# Patient Record
Sex: FEMALE | Race: WHITE | HISPANIC OR LATINO | Employment: UNEMPLOYED | URBAN - METROPOLITAN AREA
[De-identification: names, ages, dates, MRNs, and addresses within clinical notes are randomized per-mention and may not be internally consistent; named-entity substitution may affect disease eponyms.]

---

## 2019-07-18 ENCOUNTER — OFFICE VISIT (OUTPATIENT)
Dept: FAMILY MEDICINE CLINIC | Facility: CLINIC | Age: 29
End: 2019-07-18

## 2019-07-18 VITALS
HEART RATE: 83 BPM | BODY MASS INDEX: 22.22 KG/M2 | WEIGHT: 125.4 LBS | OXYGEN SATURATION: 99 % | TEMPERATURE: 97.8 F | HEIGHT: 63 IN | SYSTOLIC BLOOD PRESSURE: 117 MMHG | RESPIRATION RATE: 17 BRPM | DIASTOLIC BLOOD PRESSURE: 60 MMHG

## 2019-07-18 DIAGNOSIS — R10.31 RIGHT LOWER QUADRANT PAIN: ICD-10-CM

## 2019-07-18 DIAGNOSIS — R63.4 UNEXPLAINED WEIGHT LOSS: ICD-10-CM

## 2019-07-18 DIAGNOSIS — Z83.3 FAMILY HISTORY OF TYPE 2 DIABETES MELLITUS: ICD-10-CM

## 2019-07-18 DIAGNOSIS — R11.0 NAUSEA: Primary | ICD-10-CM

## 2019-07-18 DIAGNOSIS — R42 FEELING FAINT: ICD-10-CM

## 2019-07-18 DIAGNOSIS — N92.6 MENSTRUAL ABNORMALITY: ICD-10-CM

## 2019-07-18 DIAGNOSIS — R00.2 INTERMITTENT PALPITATIONS: ICD-10-CM

## 2019-07-18 LAB
ALBUMIN SERPL-MCNC: 5 G/DL (ref 3.5–5.2)
ALBUMIN/GLOB SERPL: 1.9 G/DL
ALP SERPL-CCNC: 54 U/L (ref 39–117)
ALT SERPL W P-5'-P-CCNC: 12 U/L (ref 1–33)
ANION GAP SERPL CALCULATED.3IONS-SCNC: 12.5 MMOL/L (ref 5–15)
AST SERPL-CCNC: 12 U/L (ref 1–32)
B-HCG UR QL: NEGATIVE
BASOPHILS # BLD AUTO: 0.03 10*3/MM3 (ref 0–0.2)
BASOPHILS NFR BLD AUTO: 0.5 % (ref 0–1.5)
BILIRUB BLD-MCNC: NEGATIVE MG/DL
BILIRUB SERPL-MCNC: 0.6 MG/DL (ref 0.2–1.2)
BUN BLD-MCNC: 10 MG/DL (ref 6–20)
BUN/CREAT SERPL: 14.1 (ref 7–25)
CALCIUM SPEC-SCNC: 9.4 MG/DL (ref 8.6–10.5)
CHLORIDE SERPL-SCNC: 99 MMOL/L (ref 98–107)
CLARITY, POC: CLEAR
CO2 SERPL-SCNC: 24.5 MMOL/L (ref 22–29)
COLOR UR: YELLOW
CREAT BLD-MCNC: 0.71 MG/DL (ref 0.57–1)
DEPRECATED RDW RBC AUTO: 45.7 FL (ref 37–54)
EOSINOPHIL # BLD AUTO: 0.08 10*3/MM3 (ref 0–0.4)
EOSINOPHIL NFR BLD AUTO: 1.4 % (ref 0.3–6.2)
ERYTHROCYTE [DISTWIDTH] IN BLOOD BY AUTOMATED COUNT: 13.5 % (ref 12.3–15.4)
GFR SERPL CREATININE-BSD FRML MDRD: 98 ML/MIN/1.73
GLOBULIN UR ELPH-MCNC: 2.7 GM/DL
GLUCOSE BLD-MCNC: 90 MG/DL (ref 65–99)
GLUCOSE UR STRIP-MCNC: NEGATIVE MG/DL
HBA1C MFR BLD: 5.17 % (ref 4.8–5.6)
HCT VFR BLD AUTO: 39.1 % (ref 34–46.6)
HGB BLD-MCNC: 12.9 G/DL (ref 12–15.9)
IMM GRANULOCYTES # BLD AUTO: 0 10*3/MM3 (ref 0–0.05)
IMM GRANULOCYTES NFR BLD AUTO: 0 % (ref 0–0.5)
INTERNAL NEGATIVE CONTROL: NEGATIVE
INTERNAL POSITIVE CONTROL: POSITIVE
KETONES UR QL: ABNORMAL
LEUKOCYTE EST, POC: NEGATIVE
LYMPHOCYTES # BLD AUTO: 1.66 10*3/MM3 (ref 0.7–3.1)
LYMPHOCYTES NFR BLD AUTO: 29.4 % (ref 19.6–45.3)
Lab: NORMAL
MCH RBC QN AUTO: 30.5 PG (ref 26.6–33)
MCHC RBC AUTO-ENTMCNC: 33 G/DL (ref 31.5–35.7)
MCV RBC AUTO: 92.4 FL (ref 79–97)
MONOCYTES # BLD AUTO: 0.48 10*3/MM3 (ref 0.1–0.9)
MONOCYTES NFR BLD AUTO: 8.5 % (ref 5–12)
NEUTROPHILS # BLD AUTO: 3.39 10*3/MM3 (ref 1.7–7)
NEUTROPHILS NFR BLD AUTO: 60.2 % (ref 42.7–76)
NITRITE UR-MCNC: NEGATIVE MG/ML
NRBC BLD AUTO-RTO: 0 /100 WBC (ref 0–0.2)
PH UR: 8.5 [PH] (ref 5–8)
PLATELET # BLD AUTO: 227 10*3/MM3 (ref 140–450)
PMV BLD AUTO: 10.9 FL (ref 6–12)
POTASSIUM BLD-SCNC: 3.6 MMOL/L (ref 3.5–5.2)
PROT SERPL-MCNC: 7.7 G/DL (ref 6–8.5)
PROT UR STRIP-MCNC: ABNORMAL MG/DL
RBC # BLD AUTO: 4.23 10*6/MM3 (ref 3.77–5.28)
RBC # UR STRIP: NEGATIVE /UL
SODIUM BLD-SCNC: 136 MMOL/L (ref 136–145)
SP GR UR: 1.01 (ref 1–1.03)
T4 FREE SERPL-MCNC: 1.61 NG/DL (ref 0.93–1.7)
TSH SERPL DL<=0.05 MIU/L-ACNC: 0.89 MIU/ML (ref 0.27–4.2)
UROBILINOGEN UR QL: NORMAL
WBC NRBC COR # BLD: 5.64 10*3/MM3 (ref 3.4–10.8)

## 2019-07-18 PROCEDURE — 84439 ASSAY OF FREE THYROXINE: CPT | Performed by: NURSE PRACTITIONER

## 2019-07-18 PROCEDURE — 36415 COLL VENOUS BLD VENIPUNCTURE: CPT | Performed by: NURSE PRACTITIONER

## 2019-07-18 PROCEDURE — 93000 ELECTROCARDIOGRAM COMPLETE: CPT | Performed by: NURSE PRACTITIONER

## 2019-07-18 PROCEDURE — 84443 ASSAY THYROID STIM HORMONE: CPT | Performed by: NURSE PRACTITIONER

## 2019-07-18 PROCEDURE — 81025 URINE PREGNANCY TEST: CPT | Performed by: NURSE PRACTITIONER

## 2019-07-18 PROCEDURE — 99204 OFFICE O/P NEW MOD 45 MIN: CPT | Performed by: NURSE PRACTITIONER

## 2019-07-18 PROCEDURE — 80053 COMPREHEN METABOLIC PANEL: CPT | Performed by: NURSE PRACTITIONER

## 2019-07-18 PROCEDURE — 81003 URINALYSIS AUTO W/O SCOPE: CPT | Performed by: NURSE PRACTITIONER

## 2019-07-18 PROCEDURE — 83036 HEMOGLOBIN GLYCOSYLATED A1C: CPT | Performed by: NURSE PRACTITIONER

## 2019-07-18 PROCEDURE — 85025 COMPLETE CBC W/AUTO DIFF WBC: CPT | Performed by: NURSE PRACTITIONER

## 2019-07-18 RX ORDER — METRONIDAZOLE 500 MG/1
TABLET ORAL
Refills: 0 | COMMUNITY
Start: 2019-05-10 | End: 2019-07-18

## 2019-07-18 RX ORDER — FLUCONAZOLE 150 MG/1
TABLET ORAL
Refills: 0 | COMMUNITY
Start: 2019-06-18 | End: 2019-07-18

## 2019-07-18 RX ORDER — ONDANSETRON HYDROCHLORIDE 8 MG/1
8 TABLET, FILM COATED ORAL EVERY 8 HOURS PRN
Refills: 0 | COMMUNITY
Start: 2019-07-11 | End: 2019-07-18

## 2019-07-18 RX ORDER — ETONOGESTREL/ETHINYL ESTRADIOL .12-.015MG
RING, VAGINAL VAGINAL
Refills: 12 | COMMUNITY
Start: 2019-06-18 | End: 2019-07-18 | Stop reason: SDDI

## 2019-07-18 NOTE — PROGRESS NOTES
"Subjective   Beatriz Jackson is a 29 y.o. female.     Ms. Jackson presents today with c/o daily nausea x 2 months. Patient states that she feels sick even smelling food and she states that she has lost 20 lbs since her symptoms began. She is also c/o weakness, fatigue, feeling \"shaky\" and sometimes feeling light-headed and feeling faint. Patient states that she has never passed out. She is also concerned because she states that she has had two periods in the past month which she states is very unusual for her. She did not start her Nuvaring as she was uncomfortable with insertion process and is not using any type of birth control at this time. Patient is  and sexually active but states she has taken two home pregnancy tests with negative results.       Nausea   This is a new problem. Episode onset: 2 months ago. The problem occurs daily. The problem has been waxing and waning. Associated symptoms include abdominal pain, fatigue and nausea. Pertinent negatives include no arthralgias, change in bowel habit, chest pain, chills, congestion, coughing, diaphoresis, fever, headaches, myalgias, numbness, rash, sore throat, swollen glands, urinary symptoms, vertigo, vomiting or weakness. Nothing aggravates the symptoms. She has tried nothing for the symptoms.       The following portions of the patient's history were reviewed and updated as appropriate: allergies, current medications, past family history, past medical history, past social history, past surgical history and problem list.    Allergies as of 07/18/2019 - Reviewed 07/18/2019   Allergen Reaction Noted   • Amoxicillin Rash 07/18/2019       Current Outpatient Medications on File Prior to Visit   Medication Sig   • PROAIR  (90 Base) MCG/ACT inhaler    • [DISCONTINUED] fluconazole (DIFLUCAN) 150 MG tablet TAKE 1 TABLET BY MOUTH EVERY 72 HOURS AS A ONE TIME DOSE   • [DISCONTINUED] metroNIDAZOLE (FLAGYL) 500 MG tablet    • [DISCONTINUED] NUVARING " 0.12-0.015 MG/24HR vaginal ring INSERT 1 RING VAGINALLY EVERY MONTH. LEAVE IN PLACE FOR 3 WEEKS THEN REMOVE FOR 1 WEEK   • [DISCONTINUED] ondansetron (ZOFRAN) 8 MG tablet Take 8 mg by mouth Every 8 (Eight) Hours As Needed. for nausea     No current facility-administered medications on file prior to visit.        Review of Systems   Constitutional: Positive for appetite change, fatigue and unexpected weight change. Negative for chills, diaphoresis and fever.   HENT: Negative.  Negative for congestion and sore throat.    Respiratory: Negative.  Negative for cough.    Cardiovascular: Positive for palpitations (occasional). Negative for chest pain and leg swelling.   Gastrointestinal: Positive for abdominal pain and nausea. Negative for abdominal distention, blood in stool, change in bowel habit, constipation, diarrhea and vomiting.   Genitourinary: Positive for menstrual problem. Negative for dysuria, flank pain, hematuria and pelvic pain.   Musculoskeletal: Negative for arthralgias and myalgias.   Skin: Negative for rash.   Neurological: Positive for light-headedness. Negative for dizziness, vertigo, tremors, seizures, syncope, facial asymmetry, speech difficulty, weakness, numbness and headaches.   Psychiatric/Behavioral: The patient is nervous/anxious.        Objective   Physical Exam   Constitutional: She is oriented to person, place, and time. Vital signs are normal. She appears well-developed and well-nourished. She is cooperative.  Non-toxic appearance. She does not have a sickly appearance. She does not appear ill. No distress.   HENT:   Head: Normocephalic and atraumatic.   Right Ear: External ear normal.   Left Ear: External ear normal.   Nose: Nose normal.   Mouth/Throat: Oropharynx is clear and moist.   Eyes: Pupils are equal, round, and reactive to light. No scleral icterus.   Neck: Normal range of motion. Neck supple. Thyromegaly: mildly enlarged.   Cardiovascular: Normal rate, regular rhythm and normal  heart sounds.   No murmur heard.  Pulmonary/Chest: Effort normal and breath sounds normal.   Abdominal: Soft. Normal appearance and bowel sounds are normal. She exhibits no mass. There is no hepatosplenomegaly. There is tenderness in the right lower quadrant. There is no rigidity and no CVA tenderness.     Vascular Status -  Her right foot exhibits no edema. Her left foot exhibits no edema.  Lymphadenopathy:     She has no cervical adenopathy.   Neurological: She is alert and oriented to person, place, and time.   Skin: Skin is warm and dry. No rash noted. She is not diaphoretic.   Vitals reviewed.    Vitals:    07/18/19 0917   BP: 117/60   Pulse: 83   Resp: 17   Temp: 97.8 °F (36.6 °C)   SpO2: 99%     Body mass index is 22.21 kg/m².    Assessment/Plan   Beatriz was seen today for weight loss.    Diagnoses and all orders for this visit:    Nausea  -     POC Pregnancy, Urine  -     US Abdomen Complete; Future  -     US Pelvis Complete; Future    Unexplained weight loss  -     TSH  -     T4, Free  -     US Abdomen Complete; Future  -     US Pelvis Complete; Future    Menstrual abnormality  -     TSH  -     T4, Free  -     POC Urinalysis Dipstick, Automated  -     POC Pregnancy, Urine  -     US Pelvis Complete; Future    Intermittent palpitations  -     ECG 12 Lead  -     Comprehensive Metabolic Panel  -     TSH  -     T4, Free    Feeling faint  -     ECG 12 Lead  -     CBC Auto Differential    Right lower quadrant pain  -     US Abdomen Complete; Future  -     US Pelvis Complete; Future    Family history of type 2 diabetes mellitus  -     Hemoglobin A1c      ECG 12 Lead  Date/Time: 7/18/2019 9:50 AM  Performed by: Tara Castro APRN  Authorized by: Tara Castro APRN   Comparison: not compared with previous ECG   Previous ECG: no previous ECG available  Rhythm: sinus rhythm  Rate: normal  BPM: 74  Conduction: conduction normal  QRS axis: normal  Other findings: poor R wave progression    Clinical impression:  normal ECG          Brief Urine Lab Results  (Last result in the past 365 days)      Color   Clarity   Blood   Leuk Est   Nitrite   Protein   CREAT   Urine HCG        07/18/19 1338               Negative         Discussed the nature of the medical condition(s) risks, complications, implications, management, safe and proper use of medications. Encouraged medication compliance, and keeping scheduled follow up appointments with me and any other providers.     Laboratory testing ordered today. Further recommendations after lab evaluation.

## 2019-07-18 NOTE — PATIENT INSTRUCTIONS
Dizziness  Dizziness is a common problem. It is a feeling of unsteadiness or light-headedness. You may feel like you are about to faint. Dizziness can lead to injury if you stumble or fall. Anyone can become dizzy, but dizziness is more common in older adults. This condition can be caused by a number of things, including medicines, dehydration, or illness.  Follow these instructions at home:  Eating and drinking  · Drink enough fluid to keep your urine clear or pale yellow. This helps to keep you from becoming dehydrated. Try to drink more clear fluids, such as water.  · Do not drink alcohol.  · Limit your caffeine intake if told to do so by your health care provider. Check ingredients and nutrition facts to see if a food or beverage contains caffeine.  · Limit your salt (sodium) intake if told to do so by your health care provider. Check ingredients and nutrition facts to see if a food or beverage contains sodium.  Activity  · Avoid making quick movements.  ? Rise slowly from chairs and steady yourself until you feel okay.  ? In the morning, first sit up on the side of the bed. When you feel okay, stand slowly while you hold onto something until you know that your balance is fine.  · If you need to  one place for a long time, move your legs often. Tighten and relax the muscles in your legs while you are standing.  · Do not drive or use heavy machinery if you feel dizzy.  · Avoid bending down if you feel dizzy. Place items in your home so that they are easy for you to reach without leaning over.  Lifestyle  · Do not use any products that contain nicotine or tobacco, such as cigarettes and e-cigarettes. If you need help quitting, ask your health care provider.  · Try to reduce your stress level by using methods such as yoga or meditation. Talk with your health care provider if you need help to manage your stress.  General instructions  · Watch your dizziness for any changes.  · Take over-the-counter and  prescription medicines only as told by your health care provider. Talk with your health care provider if you think that your dizziness is caused by a medicine that you are taking.  · Tell a friend or a family member that you are feeling dizzy. If he or she notices any changes in your behavior, have this person call your health care provider.  · Keep all follow-up visits as told by your health care provider. This is important.  Contact a health care provider if:  · Your dizziness does not go away.  · Your dizziness or light-headedness gets worse.  · You feel nauseous.  · You have reduced hearing.  · You have new symptoms.  · You are unsteady on your feet or you feel like the room is spinning.  Get help right away if:  · You vomit or have diarrhea and are unable to eat or drink anything.  · You have problems talking, walking, swallowing, or using your arms, hands, or legs.  · You feel generally weak.  · You are not thinking clearly or you have trouble forming sentences. It may take a friend or family member to notice this.  · You have chest pain, abdominal pain, shortness of breath, or sweating.  · Your vision changes.  · You have any bleeding.  · You have a severe headache.  · You have neck pain or a stiff neck.  · You have a fever.  These symptoms may represent a serious problem that is an emergency. Do not wait to see if the symptoms will go away. Get medical help right away. Call your local emergency services (911 in the [place] Do not drive yourself to the hospital.  Summary  · Dizziness is a feeling of unsteadiness or light-headedness. This condition can be caused by a number of things, including medicines, dehydration, or illness.  · Anyone can become dizzy, but dizziness is more common in older adults.  · Drink enough fluid to keep your urine clear or pale yellow. Do not drink alcohol.  · Avoid making quick movements if you feel dizzy. Monitor your dizziness for any changes.  This information is not intended to  replace advice given to you by your health care provider. Make sure you discuss any questions you have with your health care provider.  Document Released: 06/13/2002 Document Revised: 01/20/2018 Document Reviewed: 01/20/2018  [email or web address] Interactive Patient Education © 2019 [email or web address]    Nausea, Adult  Nausea is the feeling of an upset stomach or having to vomit. Nausea on its own is not usually a serious concern, but it may be an early sign of a more serious medical problem. As nausea gets worse, it can lead to vomiting. If vomiting develops, or if you are not able to drink enough fluids, you are at risk of becoming dehydrated. Dehydration can make you tired and thirsty, cause you to have a dry mouth, and decrease how often you urinate. Older adults and people with other diseases or a weak immune system are at higher risk for dehydration. The main goals of treating your nausea are:  · To limit repeated nausea episodes.  · To prevent vomiting and dehydration.    Follow these instructions at home:  Follow instructions from your health care provider about how to care for yourself at home.  Eating and drinking  Follow these recommendations as told by your health care provider:  · Take an oral rehydration solution (ORS). This is a drink that is sold at pharmacies and retail stores.  · Drink clear fluids in small amounts as you are able. Clear fluids include water, ice chips, diluted fruit juice, and low-calorie sports drinks.  · Eat bland, easy-to-digest foods in small amounts as you are able. These foods include bananas, applesauce, rice, lean meats, toast, and crackers.  · Avoid drinking fluids that contain a lot of sugar or caffeine, such as energy drinks, sports drinks, and soda.  · Avoid alcohol.  · Avoid spicy or fatty foods.    General instructions  · Drink enough fluid to keep your urine clear or pale yellow.  · Wash your hands often. If soap and water are not available, use hand .  · Make sure that all  people in your household wash their hands well and often.  · Rest at home while you recover.  · Take over-the-counter and prescription medicines only as told by your health care provider.  · Breathe slowly and deeply when you feel nauseous.  · Watch your condition for any changes.  · Keep all follow-up visits as told by your health care provider. This is important.  Contact a health care provider if:  · You have a headache.  · You have new symptoms.  · Your nausea gets worse.  · You have a fever.  · You feel light-headed or dizzy.  · You vomit.  · You cannot keep fluids down.  Get help right away if:  · You have pain in your chest, neck, arm, or jaw.  · You feel extremely weak or you faint.  · You have vomit that is bright red or looks like coffee grounds.  · You have bloody or black stools or stools that look like tar.  · You have a severe headache, a stiff neck, or both.  · You have severe pain, cramping, or bloating in your abdomen.  · You have a rash.  · You have difficulty breathing or are breathing very quickly.  · Your heart is beating very quickly.  · Your skin feels cold and clammy.  · You feel confused.  · You have pain when you urinate.  · You have signs of dehydration, such as:  ? Dark urine, very little, or no urine.  ? Cracked lips.  ? Dry mouth.  ? Sunken eyes.  ? Sleepiness.  ? Weakness.  These symptoms may represent a serious problem that is an emergency. Do not wait to see if the symptoms will go away. Get medical help right away. Call your local emergency services (911 in the [place] Do not drive yourself to the hospital.  This information is not intended to replace advice given to you by your health care provider. Make sure you discuss any questions you have with your health care provider.  Document Released: 01/25/2006 Document Revised: 05/22/2017 Document Reviewed: 08/23/2016  [employer] Interactive Patient Education © 2019 [employer] Inc.    Near-Syncope  Near-syncope is when you suddenly become  weak or dizzy, or you feel like you might pass out (faint). During an episode of near-syncope, you may:  · Feel dizzy or light-headed.  · Feel nauseous.  · See all white or all black in your field of vision.  · Have cold, clammy skin.    This condition is caused by a sudden decrease in blood flow to the brain. This decrease can result from various causes, but most of those causes are not dangerous. However, near-syncope can be a sign of a serious medical problem, so it is important to seek medical care.  If you fainted, get medical help right away.Call your local emergency services (911 in the [place] Do not drive yourself to the hospital.  Follow these instructions at home:  Pay attention to any changes in your symptoms. Take these actions to help with your condition:  · Have someone stay with you until you feel stable.  · Do not drive, use machinery, or play sports until your health care provider says it is okay.  · Keep all follow-up visits as told by your health care provider. This is important.  · If you start to feel like you might faint, lie down right away and raise (elevate) your feet above the level of your heart. Breathe deeply and steadily. Wait until all of the symptoms have passed.  · Drink enough fluid to keep your urine clear or pale yellow.  · If you are taking blood pressure or heart medicine, get up slowly and take several minutes to sit and then stand. This can reduce dizziness.  · Take over-the-counter and prescription medicines only as told by your health care provider.    Get help right away if:  · You have a severe headache.  · You have unusual pain in your chest, abdomen, or back.  · You are bleeding from your mouth or rectum, or you have black or tarry stool.  · You have a very fast or irregular heartbeat (palpitations).  · You faint once or repeatedly.  · You have a seizure.  · You are confused.  · You have trouble walking.  · You have severe weakness.  · You have vision problems.  These  symptoms may represent a serious problem that is an emergency. Do not wait to see if your symptoms will go away. Get medical help right away. Call your local emergency services (911 in the [place] Do not drive yourself to the hospital.  This information is not intended to replace advice given to you by your health care provider. Make sure you discuss any questions you have with your health care provider.  Document Released: 12/18/2006 Document Revised: 01/30/2018 Document Reviewed: 08/31/2016  [employer] Interactive Patient Education © 2019 [employer].    Náuseas, en adultos  Nausea, Adult  Náuseas es la sensación de malestar en el estómago o de tener ganas de vomitar. Las náuseas en sí a menudo no constituyen garret preocupación seria, elen pueden ser un signo temprano de problemas médicos más graves. Si las náuseas empeoran, pueden provocar vómitos. Si los vómitos empeoran o si usted no puede beber suficiente líquido, corre el riesgo de deshidratarse. La deshidratación puede causarle cansancio, sed, sequedad en la boca y disminución en la frecuencia con la que orina. Los adultos mayores y las personas que tienen otras enfermedades o un sistema inmunitario débil están en mayor riesgo de deshidratación. Los objetivos principales del tratamiento de las náuseas son los siguientes:  · Restringir los episodios reiterados de náuseas.  · Prevenir los vómitos y la deshidratación.    Siga estas indicaciones en brasher casa:  Siga las indicaciones del médico sobre cómo debe cuidarse en brasher casa.  Qué debe comer y beber  Siga estas recomendaciones thiago se lo haya indicado el médico:  · [place] garret solución de rehidratación oral (oral rehydration solution, ORS). Esta es garret bebida que se vende en farmacias y tiendas minoristas.  · Saima líquidos veronique en pequeñas cantidades según le sea posible. Saima líquidos veronique, thiago agua, cubitos de hielo, jugos de fruta diluidos y bebidas deportivas bajas en calorías.  · En la medida en que pueda,  consuma alimentos blandos y fáciles de digerir en pequeñas cantidades. Estos alimentos incluyen bananas, compota de manzana, arroz, brian magras, tostadas y galletas saladas.  · Evite consumir líquidos que contengan mucha azúcar o cafeína, thiago bebidas energéticas, bebidas deportivas y refrescos.  · Evite el alcohol.  · Evite los alimentos picantes o con alto contenido de grasa.    Instrucciones generales  · Saima suficiente líquido para mantener la orina rusty o de color amarillo pálido.  · Lávese las tima con frecuencia. Use desinfectante para tima si no dispone de agua y jabón.  · Asegúrese de que todas las personas que viven en brasher casa se laven jayshree las tima y con frecuencia.  · Descanse en brasher casa mientras se recupera.  · [place] los medicamentos de venta pawan y los recetados solamente thiago se lo haya indicado el médico.  · Cuando sienta náuseas, respire lenta y profundamente.  · Controle brasher afección para detectar cualquier cambio.  · Concurra a todas las visitas de seguimiento thiago se lo haya indicado el médico. [place] es importante.  Comuníquese con un médico si:  · Tiene dolor de bebe.  · Aparecen nuevos síntomas.  · Las náuseas empeoran.  · Tiene fiebre.  · Se siente mareado o siente que va a desvanecerse.  · Vomita.  · No puede retener los líquidos.  Solicite ayuda de inmediato si:  · Siente dolor en el pecho, el antonia, los brazos o la mandíbula.  · Se siente muy débil o se desmaya.  · Vomita y el vómito es de color jo intenso o se asemeja al poso del café.  · Tiene heces con ariane, de color page, o con aspecto alquitranado.  · Siente dolor de bebe intenso, rigidez en el antonia, o ambas cosas.  · Tiene dolor intenso, cólicos o distensión abdominal.  · Tiene garret erupción cutánea.  · Tiene dificultades para respirar o respira muy rápido.  · Brasher corazón late muy rápidamente.  · Siente la piel fría y húmeda.  · Se siente confundido.  · Siente dolor al orinar.  · Tiene signos de deshidratación, thiago los  siguientes:  ? Orina de color oscuro, muy escasa o no orina.  ? Labios agrietados.  ? Boca seca.  ? Ojos hundidos.  ? Somnolencia.  ? Debilidad.  Estos síntomas pueden representar un problema grave que constituye garret emergencia. No espere hasta que los síntomas desaparezcan. Solicite atención médica de inmediato. Comuníquese con el servicio de emergencias de brasher localidad (911 en los [place]). No conduzca por fredrick propios medios hasta el hospital.  Esta información no tiene thiago fin reemplazar el consejo del médico. Asegúrese de hacerle al médico cualquier pregunta que tenga.  Document Released: 12/18/2006 Document Revised: 04/30/2018 Document Reviewed: 08/23/2016  [employer] Interactive Patient Education © 2019 [employer].    Palpitaciones  Palpitations  Es la sensación de sentir que el latido cardíaco es irregular o es más rápido que lo normal. Se siente thiago un aleteo o que falta un latido. Generalmente no es un problema grave. Las causas de las palpitaciones pueden ser diversas, entre ellas, el estrés y consumo de cigarrillos, cafeína, alcohol y determinados medicamentos. Si jayshree la mayoría de las causas de las palpitaciones no son graves, estas pueden ser un signo de un problema médico grave. En algunos casos, podría ser necesario hacer más estudios.  Siga estas instrucciones en brasher casa:  Esté atento a cualquier cambio en los síntomas. [place] estas medidas para controlar la afección:  · Evite consumir lo siguiente:  ? Bebidas que contengan cafeína thiago el café, el té, los refrescos, las pastillas para adelgazar y las bebidas energizantes.  ? Chocolate.  ? Alcohol.  · No consuma ningún producto que contenga tabaco, lo que incluye cigarrillos, tabaco de mascar y cigarrillos electrónicos. Si necesita ayuda para dejar de fumar, consulte al médico.  · Trate de reducir los niveles de estrés y ansiedad. Algunas cosas que pueden ayudarlo a relajarse son:  ? Practicar yoga.  ? Meditación.  ? Actividad física thiago  natación, trote o caminatas.  ? Biorretroalimentación. [place] es un método que le enseña a usar la mente para controlar cosas del cuerpo, thiago los latidos del corazón.  · Descanse y duerma lo suficiente.  · [place] los medicamentos de venta pawan y los recetados solamente thiago se lo haya indicado el médico.  · Concurra a todas las visitas de control thiago se lo haya indicado el médico. [place] es importante.    Comuníquese con un médico si:  · Continúa con latidos cardíacos rápidos o irregulares después de 24 horas.  · Las palpitaciones le suceden con más frecuencia.  Solicite ayuda de inmediato si:  · Siente falta de aire o dolor en el pecho.  · Tiene un dolor de bebe intenso.  · Se siente mareado o se desmaya.  Esta información no tiene thiago fin reemplazar el consejo del médico. Asegúrese de hacerle al médico cualquier pregunta que tenga.  Document Released: 09/27/2006 Document Revised: 03/27/2018 Document Reviewed: 09/01/2016  [employer] Interactive Patient Education © 2019 [employer]    Presíncope  Near-Syncope  El presíncope ocurre cuando, súbitamente, usted se siente débil, mareado o thiago si se fuera a desmayar. [place] un episodio de presíncope, usted puede tener los siguientes síntomas:  · Sentirse mareado o aturdido.  · Sentir náuseas.  · [place] todo villela o page en el [place] de la visión.  · Tener la piel fría y húmeda.    La causa de esta afección es garret disminución súbita del flujo de ariane al cerebro. Esta disminución puede ocurrir por varios motivos, elen la mayoría de las veces no es peligroso. Sin embargo, el presíncope puede ser garret señal de un problema médico grave, por eso es importante buscar atención médica.  Si se desmayó, obtenga ayuda de inmediato.Llame a brasher servicio de emergencias local (911 en los [place] No conduzca por fredrick propios medios hasta el hospital.  Siga estas indicaciones en brasher casa:  Esté atento a cualquier cambio en los síntomas. [place] estas medidas para controlar brasher  afección:  · [place] a alguien que se quede con usted hasta que se sienta estable.  · No conduzca vehículos, no use maquinarias ni practique deportes hasta que el médico lo autorice.  · Concurra a todas las visitas de seguimiento thiago se lo haya indicado el médico. [place] es importante.  · Si comienza a sentir que podría desmayarse, recuéstese de inmediato y alce (eleve) los pies por encima del nivel del corazón. Respire profundamente y de manera continua. Espere hasta que los síntomas hayan desaparecido.  · Saima suficiente líquido thiago para mantener la orina rusty o de color amarillo pálido.  · Si está tomando medicamentos para la presión arterial o para el corazón, póngase de pie lentamente, tómese algunos minutos para permanecer sentado y luego párese. [place] reduce los mareos.  · [place] los medicamentos de venta pawan y los recetados solamente thiago se lo haya indicado el médico.    Solicite ayuda de inmediato si:  · Tiene un dolor de bebe intenso.  · Siente un dolor fuera de lo normal en el pecho, el abdomen o la espalda.  · Sangra por la boca o el recto, o fredrick heces son de color page o aspecto alquitranado.  · Tiene latidos cardíacos irregulares o muy rápidos (palpitaciones).  · Se desmaya garret o varias veces.  · Tiene garret convulsión.  · Se siente confundido.  · Presenta dificultad para caminar.  · Siente debilidad intensa.  · Tiene problemas de visión.  Estos síntomas pueden representar un problema grave que constituye garret emergencia. No espere hasta que los síntomas desaparezcan. Solicite atención médica de inmediato. Comuníquese con el servicio de emergencias de brasher localidad (911 en los [place]). No conduzca por fredrick propios medios hasta el hospital.  Esta información no tiene thiago fin reemplazar el consejo del médico. Asegúrese de hacerle al médico cualquier pregunta que tenga.  Document Released: 12/18/2006 Document Revised: 09/17/2018 Document Reviewed: 08/31/2016  [employer] Interactive Patient Education  © 2019 [employer] Inc.    Dolor abdominal en adultos  Abdominal Pain, Adult  El dolor abdominal puede tener muchas causas. A menudo, no es grave y mejora sin tratamiento o con tratamiento en la casa. Sin embargo, a veces el dolor abdominal es intenso. El médico revisará fredrick antecedentes médicos y le hará un examen físico para tratar de determinar la causa del dolor abdominal.  Siga estas instrucciones en brasher casa:  · [place] los medicamentos de venta pawan y los recetados solamente thiago se lo haya indicado el médico. No tome un laxante a menos que se lo haya indicado el médico.  · Saima suficiente líquido para mantener la orina rusty o de color amarillo pálido.  · Controle brasher afección para lani si hay cambios.  · Concurra a todas las visitas de control thiago se lo haya indicado el médico. [place] es importante.  Comuníquese con un médico si:  · El dolor abdominal cambia o empeora.  · No tiene apetito o baja de peso sin proponérselo.  · Está estreñido o tiene diarrea sofia más de 2 o 3 días.  · Tiene dolor cuando orina o defeca.  · El dolor abdominal lo despierta de noche.  · El dolor empeora con las comidas, después de comer o con determinados alimentos.  · Tiene vómitos y no puede retener nada.  · Tiene fiebre.  Solicite ayuda de inmediato si:  · El dolor no desaparece tan pronto thiago el médico le dijo que era esperable.  · No puede detener los vómitos.  · El dolor se siente solo en zonas del abdomen, thiago el lado derecho o la parte inferior izquierda del abdomen.  · Las heces son sanguinolentas o de color page, o de aspecto alquitranado.  · Tiene dolor intenso, cólicos, o meteorismo en el abdomen.  · Tiene signos de deshidratación, por ejemplo:  ? Orina oscura, muy escasa o falta de orina.  ? Labios agrietados.  ? Boca seca.  ? Ojos hundidos.  ? Somnolencia.  ? Debilidad.  Esta información no tiene thiago fin reemplazar el consejo del médico. Asegúrese de hacerle al médico cualquier pregunta que tenga.  Document Released:  12/18/2006 Document Revised: 12/07/2017 Document Reviewed: 05/31/2017  [employer] Interactive Patient Education © 2019 [employer]  Ecografía abdominal o pélvica  (Abdominal or Pelvic Ultrasound)  Garret ecografía es garret prueba que utiliza ondas sonoras para soy imágenes del interior del cuerpo. Garret ecografía abdominal jessica imágenes del interior del abdomen y garret ecografía pélvica jessica imágenes del interior de la pelvis. Garret ecografía abdominal o pélvica se puede realizar para lo siguiente:  · Proporcionar información sobre un bebé que se está desarrollando en el útero, thiago la posición y el ritmo cardíaco del bebé.  · Para controlar la forma o el tamaño de un órgano.  · Para controlar si hay problemas thiago los siguientes:  ? Quistes.  ? Masas.  ? Inflamación.  ? Cálculos en el riñón.  ? Cálculos biliares.  INFORME A BRASHER MÉDICO:  · Cualquier alergia que tenga.  · Todos los medicamentos que utiliza, incluidos vitaminas, hierbas, gotas oftálmicas, cremas y medicamentos de venta pawan.  · Si tiene cirugías previas.  · Cualquier enfermedad que tenga.  · Si está embarazada o podría estarlo.  RIESGOS Y COMPLICACIONES  No se conocen riesgos o complicaciones por someterse esta prueba.  ANTES DEL PROCEDIMIENTO  · Siga las indicaciones del médico respecto de las restricciones para las comidas o las bebidas.  · Use ropa que se pueda elmo fácilmente en shasha de que el gel usado para la prueba manche la ropa.  PROCEDIMIENTO  · Le aplicarán un gel sobre la piel. Puede sentir que está frío.  · En la kojo a examinar, se colocará un dispositivo similar a garret vara denominado transductor.  · El transductor tomará imágenes. Estas imágenes se verán en cayla o más monitores similares a pequeñas pantallas de televisión.  DESPUÉS DEL PROCEDIMIENTO  · Es brahser responsabilidad retirar el resultado del estudio. Consulte a brasher médico o en el departamento donde se realice el procedimiento cuándo estarán listos los resultados.  · Concurra a todas las  visitas de control thiago se lo haya indicado el médico. [place] es importante.  Esta información no tiene thiago fin reemplazar el consejo del médico. Asegúrese de hacerle al médico cualquier pregunta que tenga.  Document Released: 09/27/2006 Document Revised: 04/10/2017 Document Reviewed: 09/09/2016  Elsevier Interactive Patient Education © 2019 [employer]

## 2019-07-19 ENCOUNTER — LAB (OUTPATIENT)
Dept: FAMILY MEDICINE CLINIC | Facility: CLINIC | Age: 29
End: 2019-07-19

## 2019-07-19 DIAGNOSIS — R11.0 NAUSEA: ICD-10-CM

## 2019-07-19 DIAGNOSIS — R63.4 UNEXPLAINED WEIGHT LOSS: ICD-10-CM

## 2019-07-19 DIAGNOSIS — R10.31 RIGHT LOWER QUADRANT PAIN: ICD-10-CM

## 2019-07-19 PROCEDURE — 36415 COLL VENOUS BLD VENIPUNCTURE: CPT | Performed by: NURSE PRACTITIONER

## 2019-07-19 PROCEDURE — 80074 ACUTE HEPATITIS PANEL: CPT | Performed by: NURSE PRACTITIONER

## 2019-07-19 PROCEDURE — 87340 HEPATITIS B SURFACE AG IA: CPT | Performed by: NURSE PRACTITIONER

## 2019-07-20 LAB
HAV IGM SERPL QL IA: NORMAL
HBV CORE IGM SERPL QL IA: NORMAL
HBV SURFACE AG SERPL QL IA: NORMAL
HCV AB SER DONR QL: NORMAL

## 2019-07-21 ENCOUNTER — TELEPHONE (OUTPATIENT)
Dept: FAMILY MEDICINE CLINIC | Facility: CLINIC | Age: 29
End: 2019-07-21

## 2019-07-21 NOTE — TELEPHONE ENCOUNTER
Pt advised.   ----- Message from SHEY Billy sent at 7/19/2019  6:54 PM EDT -----  Please let her know that her lab results were normal.

## 2019-07-25 ENCOUNTER — TELEPHONE (OUTPATIENT)
Dept: FAMILY MEDICINE CLINIC | Facility: CLINIC | Age: 29
End: 2019-07-25

## 2019-07-25 NOTE — TELEPHONE ENCOUNTER
Pt advised.   ----- Message from SHEY Billy sent at 7/24/2019  9:14 AM EDT -----  Please let her know that she tested negative for Hepatitis A, B and C.

## 2019-08-01 ENCOUNTER — HOSPITAL ENCOUNTER (OUTPATIENT)
Dept: ULTRASOUND IMAGING | Facility: HOSPITAL | Age: 29
Discharge: HOME | End: 2019-08-01

## 2019-08-01 ENCOUNTER — HOSPITAL ENCOUNTER (OUTPATIENT)
Dept: ULTRASOUND IMAGING | Facility: HOSPITAL | Age: 29
Discharge: HOME | End: 2019-08-01
Admitting: NURSE PRACTITIONER

## 2019-08-01 DIAGNOSIS — R11.0 NAUSEA: ICD-10-CM

## 2019-08-01 DIAGNOSIS — R10.31 RIGHT LOWER QUADRANT PAIN: ICD-10-CM

## 2019-08-01 DIAGNOSIS — N92.6 MENSTRUAL ABNORMALITY: ICD-10-CM

## 2019-08-01 DIAGNOSIS — R63.4 UNEXPLAINED WEIGHT LOSS: ICD-10-CM

## 2019-08-01 PROCEDURE — 76700 US EXAM ABDOM COMPLETE: CPT

## 2019-08-01 PROCEDURE — 76856 US EXAM PELVIC COMPLETE: CPT

## 2019-08-05 ENCOUNTER — TELEPHONE (OUTPATIENT)
Dept: FAMILY MEDICINE CLINIC | Facility: CLINIC | Age: 29
End: 2019-08-05

## 2019-08-05 NOTE — TELEPHONE ENCOUNTER
Called patient to let her know results of US. Patient states she understands and has no questions at this time.   ----- Message from SHEY Billy sent at 8/5/2019  9:45 AM EDT -----  Please let her know that her ultrasound of the abdomen and pelvis were normal. If she is still experiencing symptoms (nausea, abdominal pain, weight loss, etc.) I would recommend she follow-up with Dr. Madison this week (her PCP) for further evaluation.

## 2022-11-21 ENCOUNTER — TELEPHONE (OUTPATIENT)
Dept: OBSTETRICS AND GYNECOLOGY | Facility: CLINIC | Age: 32
End: 2022-11-21

## 2022-11-21 NOTE — TELEPHONE ENCOUNTER
Provider: DR EVANS  Caller: CORRINA OLIVO  Relationship to Patient: SELF  Reason for Call: SAME DAY CANCEL - STARTED CYCLE.    PLEASE CALL PT TO RESCHEDULE - UNABLE TO FIND ANY AVAILABILITY FOR DR EVANS.  ALSO TRIED WITH [facility] - NO AVAILABLE NEW GYN APPTS.

## 2023-02-09 ENCOUNTER — PATIENT ROUNDING (BHMG ONLY) (OUTPATIENT)
Dept: OBSTETRICS AND GYNECOLOGY | Facility: CLINIC | Age: 33
End: 2023-02-09
Payer: COMMERCIAL

## 2023-02-09 ENCOUNTER — OFFICE VISIT (OUTPATIENT)
Dept: OBSTETRICS AND GYNECOLOGY | Facility: CLINIC | Age: 33
End: 2023-02-09
Payer: COMMERCIAL

## 2023-02-09 VITALS — WEIGHT: 149.6 LBS | DIASTOLIC BLOOD PRESSURE: 78 MMHG | BODY MASS INDEX: 26.5 KG/M2 | SYSTOLIC BLOOD PRESSURE: 130 MMHG

## 2023-02-09 DIAGNOSIS — Z01.419 WOMEN'S ANNUAL ROUTINE GYNECOLOGICAL EXAMINATION: Primary | ICD-10-CM

## 2023-02-09 DIAGNOSIS — Z30.015 ENCOUNTER FOR INITIAL PRESCRIPTION OF VAGINAL RING HORMONAL CONTRACEPTIVE: ICD-10-CM

## 2023-02-09 LAB
B-HCG UR QL: NEGATIVE
EXPIRATION DATE: NORMAL
INTERNAL NEGATIVE CONTROL: NORMAL
INTERNAL POSITIVE CONTROL: NORMAL
Lab: NORMAL

## 2023-02-09 PROCEDURE — 99385 PREV VISIT NEW AGE 18-39: CPT

## 2023-02-09 PROCEDURE — 81025 URINE PREGNANCY TEST: CPT

## 2023-02-09 RX ORDER — ETONOGESTREL AND ETHINYL ESTRADIOL 11.7; 2.7 MG/1; MG/1
INSERT, EXTENDED RELEASE VAGINAL
Qty: 3 EACH | Refills: 3 | Status: SHIPPED | OUTPATIENT
Start: 2023-02-09

## 2023-02-09 NOTE — PROGRESS NOTES
February 9, 2023    Hello, may I speak with Beatriz Byers?    My name is KEISHA    I am  with [facility]   [facility] [address]  [address]  [facility] [address] [phone].    Before we get started may I verify your date of birth? 1990    I am calling to officially welcome you to our practice and ask about your recent visit. Is this a good time to talk? yes    Tell me about your visit with us. What things went well?  EVERYONE WAS VERY SWEET AND MADE HER FEEL COMFORTABLE FROM CHECK IN TO CHECK OUT GREAT       We're always looking for ways to make our patients' experiences even better. Do you have recommendations on ways we may improve?  no    Overall were you satisfied with your first visit to our practice? yes       I appreciate you taking the time to speak with me today. Is there anything else I can do for you? no      Thank you, and have a great day.

## 2023-02-09 NOTE — PROGRESS NOTES
Gynecologic Annual Exam Note        Gynecologic Exam and Establish Care        Subjective     HPI  Beatriz Byers is a 32 y.o.  female who presents for annual well woman exam as a new patient. There were no changes to her medical or surgical history since her last visit.. Patient reports problems with: frequent/recurring BV. Patient's last menstrual period was 2023 (exact date).. Her periods occur every 25-35 days , lasting 4 days. The flow is heavy saturating a pad/tampon every 4 hours. This heavy bleeding lasts for 4 days of her period... She reports dysmenorrhea is moderate, occurring throughout menses. Partner Status: Marital Status: .  She is sexually active. She has not had new partners.. STD testing recommendations have been explained to the patient and she does desire STD testing.    Patient reports she does heavy lifting, she reports for about a month she has been feeling vaginal pressure, she reports she has seen a protrusion but not all the way out of her body. She reports occasional lower back pain, she reports 2 weeks she has had constipation.     Additional OB/GYN History   Current contraception: contraceptive methods: None  Desires to: start contraception  Thromboembolic Disease: none  Age of menarche: 12    History of STD: no    Last Pap : 3-4 years ago per patient. Results: unknown . HPV: unknown . Patient reports never had abnormal   Last Completed Pap Smear     This patient has no relevant Health Maintenance data.           History of abnormal Pap smear: no  Gardasil status:missed  Family history of uterine, colon, breast, or ovarian cancer: no  Performs monthly Self-Breast Exam: no  Exercises Regularly:yes  Feelings of Anxiety or Depression: no  Tobacco Usage?: No       Current Outpatient Medications:   •  PROAIR  (90 Base) MCG/ACT inhaler, , Disp: , Rfl: 1  •  etonogestrel-ethinyl estradiol (NuvaRing) 0.12-0.015 MG/24HR vaginal ring, Insert vaginally and  leave in place for 3 consecutive weeks, then remove for 1 week., Disp: 3 each, Rfl: 3     Patient denies the need for medication refills today.    OB History        4    Para   3    Term   3            AB   1    Living           SAB   1    IAB        Ectopic        Molar        Multiple        Live Births                    Health Maintenance   Topic Date Due   • Annual Gynecologic Pelvic and Breast Exam  Never done   • COVID-19 Vaccine (1) Never done   • TDAP/TD VACCINES (1 - Tdap) Never done   • ANNUAL PHYSICAL  Never done   • PAP SMEAR  Never done   • INFLUENZA VACCINE  2022   • HEPATITIS C SCREENING  Completed   • Pneumococcal Vaccine 0-64  Aged Out       Past Medical History:   Diagnosis Date   • Allergic         Past Surgical History:   Procedure Laterality Date   •  SECTION      Two        The additional following portions of the patient's history were reviewed and updated as appropriate: allergies, current medications, past family history, past medical history, past social history and past surgical history.    Review of Systems   Genitourinary: Positive for menstrual problem.   Psychiatric/Behavioral: Negative for depressed mood. The patient is not nervous/anxious.    All other systems reviewed and are negative.        I have reviewed and agree with the HPI, ROS, and historical information as entered above. Savanna Tate, APRN        Objective   /78   Wt 67.9 kg (149 lb 9.6 oz)   LMP 2023 (Exact Date)   BMI 26.50 kg/m²     Physical Exam  Vitals and nursing note reviewed. Exam conducted with a chaperone present.   Constitutional:       General: She is not in acute distress.     Appearance: She is not ill-appearing.   Pulmonary:      Effort: Pulmonary effort is normal. No respiratory distress.   Chest:   Breasts:     Right: Normal.      Left: Normal.   Abdominal:      General: There is no distension.      Palpations: Abdomen is soft. There is no mass.       Tenderness: There is no abdominal tenderness. There is no guarding or rebound.      Hernia: No hernia is present.   Genitourinary:     General: Normal vulva.      Exam position: Lithotomy position.      Vagina: Normal.      Cervix: Normal.      Uterus: Normal.       Adnexa: Right adnexa normal and left adnexa normal.      Rectum: Normal.   Skin:     General: Skin is warm and dry.   Neurological:      Mental Status: She is alert and oriented to person, place, and time.   Psychiatric:         Mood and Affect: Mood normal.         Behavior: Behavior normal.            Assessment and Plan    Problem List Items Addressed This Visit    None  Visit Diagnoses     Women's annual routine gynecological examination    -  Primary    Relevant Orders    LIQUID-BASED PAP SMEAR, [facility] ([facility])    Encounter for initial prescription of vaginal ring hormonal contraceptive        Relevant Medications    etonogestrel-ethinyl estradiol (NuvaRing) 0.12-0.015 MG/24HR vaginal ring    Other Relevant Orders    POC Pregnancy, Urine (Completed)          1. GYN annual well woman exam.   2. Reviewed pap guidelines. Pap performed today with std testing per patient request.   3. Encouraged use of condoms for STD prevention.  4. OCP's/Vaginal Ring - Discussed side effects of nausea, BTB, headaches, breast tenderness and slight weight gain in the first three cycles.  Understands risks of blood clots, stroke, and theoretical risk of breast cancer.  Denies family history of blood clots.  5. Reviewed risks/benefits of hormonal contraception after age 35, including possible increased risk of breast cancer, heart disease, blood clots and strokes.  Patient strongly desires to stay on hormonal contraception.  6. Fibrocystic breast changes - Encouraged decreasing caffeine, supportive bra, low dose vitamin E supplementation.  7. Reviewed monthly self breast exams.  Instructed to call with lumps, pain, or breast discharge.    8. RTC in 1 year or PRN  with problems    Savanna Tate, APRN  02/09/2023

## 2023-02-14 LAB — REF LAB TEST METHOD: NORMAL

## 2023-04-02 DIAGNOSIS — Z30.015 ENCOUNTER FOR INITIAL PRESCRIPTION OF VAGINAL RING HORMONAL CONTRACEPTIVE: ICD-10-CM

## 2023-04-03 RX ORDER — ETONOGESTREL AND ETHINYL ESTRADIOL 11.7; 2.7 MG/1; MG/1
INSERT, EXTENDED RELEASE VAGINAL
Qty: 3 EACH | Refills: 3 | OUTPATIENT
Start: 2023-04-03

## 2024-02-12 ENCOUNTER — OFFICE VISIT (OUTPATIENT)
Dept: OBSTETRICS AND GYNECOLOGY | Facility: CLINIC | Age: 34
End: 2024-02-12
Payer: COMMERCIAL

## 2024-02-12 VITALS — BODY MASS INDEX: 25.9 KG/M2 | DIASTOLIC BLOOD PRESSURE: 68 MMHG | WEIGHT: 146.2 LBS | SYSTOLIC BLOOD PRESSURE: 116 MMHG

## 2024-02-12 DIAGNOSIS — Z01.419 ENCOUNTER FOR GYNECOLOGICAL EXAMINATION WITHOUT ABNORMAL FINDING: Primary | ICD-10-CM

## 2024-02-12 DIAGNOSIS — Z30.44 ENCOUNTER FOR SURVEILLANCE OF VAGINAL RING HORMONAL CONTRACEPTIVE DEVICE: ICD-10-CM

## 2024-02-12 PROCEDURE — 99395 PREV VISIT EST AGE 18-39: CPT

## 2024-02-12 PROCEDURE — 99459 PELVIC EXAMINATION: CPT

## 2024-02-12 RX ORDER — ETONOGESTREL AND ETHINYL ESTRADIOL VAGINAL RING .015; .12 MG/D; MG/D
RING VAGINAL
Qty: 3 EACH | Refills: 3 | Status: SHIPPED | OUTPATIENT
Start: 2024-02-12

## 2024-02-13 ENCOUNTER — TELEPHONE (OUTPATIENT)
Dept: OBSTETRICS AND GYNECOLOGY | Facility: CLINIC | Age: 34
End: 2024-02-13
Payer: COMMERCIAL

## 2024-09-16 ENCOUNTER — OFFICE VISIT (OUTPATIENT)
Dept: OBSTETRICS AND GYNECOLOGY | Facility: CLINIC | Age: 34
End: 2024-09-16
Payer: COMMERCIAL

## 2024-09-16 VITALS
WEIGHT: 153.6 LBS | BODY MASS INDEX: 27.21 KG/M2 | DIASTOLIC BLOOD PRESSURE: 70 MMHG | HEIGHT: 63 IN | SYSTOLIC BLOOD PRESSURE: 120 MMHG

## 2024-09-16 DIAGNOSIS — N92.1 MENORRHAGIA WITH IRREGULAR CYCLE: ICD-10-CM

## 2024-09-16 DIAGNOSIS — Z30.011 ENCOUNTER FOR INITIAL PRESCRIPTION OF CONTRACEPTIVE PILLS: Primary | ICD-10-CM

## 2024-09-16 PROCEDURE — 99213 OFFICE O/P EST LOW 20 MIN: CPT | Performed by: ADVANCED PRACTICE MIDWIFE

## 2024-09-16 RX ORDER — ACETAMINOPHEN AND CODEINE PHOSPHATE 120; 12 MG/5ML; MG/5ML
1 SOLUTION ORAL DAILY
Qty: 84 TABLET | Refills: 4 | Status: SHIPPED | OUTPATIENT
Start: 2024-09-16 | End: 2025-09-16

## 2024-10-04 ENCOUNTER — OFFICE VISIT (OUTPATIENT)
Dept: OBSTETRICS AND GYNECOLOGY | Facility: CLINIC | Age: 34
End: 2024-10-04
Payer: COMMERCIAL

## 2024-10-04 VITALS
DIASTOLIC BLOOD PRESSURE: 76 MMHG | SYSTOLIC BLOOD PRESSURE: 108 MMHG | BODY MASS INDEX: 26.72 KG/M2 | HEIGHT: 63 IN | WEIGHT: 150.8 LBS

## 2024-10-04 DIAGNOSIS — D25.1 INTRAMURAL LEIOMYOMA OF UTERUS: ICD-10-CM

## 2024-10-04 DIAGNOSIS — N92.1 MENORRHAGIA WITH IRREGULAR CYCLE: Primary | ICD-10-CM

## 2024-10-04 DIAGNOSIS — N84.0 ENDOMETRIAL POLYP: ICD-10-CM

## 2024-10-04 NOTE — PROGRESS NOTES
Chief Complaint   Patient presents with    Follow-up    Menorrhagia    Irregular Cycles         Subjective   HPI  Beatriz Dias is a 34 y.o. female, , Patient's last menstrual period was 2024 (exact date).. She presents for follow up for menorrhagia with irregular cycles. She saturates 1 tampon(s)/pad(s) every 2-3 hrs for 1 day. . The menstrual problem began  about 3 or so months  ago. Prior to today's visit, the patient has been evaluated:   at last visit . In the past the patient has tried  nuva ring but gave patient heart palpitations  for treatment without success. The patient reports additional symptoms as anxiety, bloating, breast tenderness, diarrhea, and intermenstrual bleeding.      US was done today.     Thromboembolic Disease: none  History of hypertension: no  History of migraines: no  Tobacco Usage?: No     Additional OB/GYN History   Last Pap : 23 wnl/hpv negative  Last Completed Pap Smear            PAP SMEAR (Every 3 Years) Next due on 2023  LIQUID-BASED PAP SMEAR, [facility] ([facility])                      Current Outpatient Medications:     norethindrone (MICRONOR) 0.35 MG tablet, Take 1 tablet by mouth Daily., Disp: 84 tablet, Rfl: 4     Past Medical History:   Diagnosis Date    Irregular menstrual cycle         Past Surgical History:   Procedure Laterality Date     SECTION      x2         The additional following portions of the patient's history were reviewed and updated as appropriate: allergies, current medications, past family history, past medical history, past social history, past surgical history, and problem list.    Review of Systems   Constitutional: Negative.    HENT: Negative.     Eyes: Negative.    Respiratory: Negative.     Cardiovascular: Negative.    Gastrointestinal: Negative.    Endocrine: Negative.    Genitourinary:  Positive for dyspareunia and menstrual problem.   Musculoskeletal: Negative.    Skin:  "Negative.    Allergic/Immunologic: Negative.    Neurological: Negative.    Hematological: Negative.    Psychiatric/Behavioral: Negative.         I have reviewed and agree with the HPI, ROS, and historical information as entered above. Savanna Tate, APRN      Objective   /76   Ht 160 cm (63\")   Wt 68.4 kg (150 lb 12.8 oz)   LMP 09/30/2024 (Exact Date) Comment: still on cycle today  BMI 26.71 kg/m²     Physical Exam  Vitals and nursing note reviewed.   Constitutional:       Appearance: Normal appearance.   Pulmonary:      Effort: Pulmonary effort is normal. No respiratory distress.   Abdominal:      Palpations: Abdomen is soft.   Neurological:      Mental Status: She is alert.   Psychiatric:         Attention and Perception: Attention normal.         Mood and Affect: Mood is anxious. Affect is tearful.         Speech: Speech normal.         Behavior: Behavior is cooperative.         Thought Content: Thought content normal.         Cognition and Memory: Cognition normal.         Judgment: Judgment normal.         Assessment & Plan     Assessment     Problem List Items Addressed This Visit          Genitourinary and Reproductive     Menorrhagia with irregular cycle - Primary    Intramural leiomyoma of uterus    Overview     10/4/24 5.5mm x3.5mm x 5.3mm vol 0.054cm          Endometrial polyp    Overview     10/4/24 Heterogeneous with echogenic area seen with the [facility] 15 mm x 3mm x 13mm suggestive of a polyp. Endometrial thickness 6.0mm              Plan     Call for heavy bleeding  Return to office for Endometrial Biopsy  Discussed options both medical and surgical.  Discussed potential etiologies and treatment options.   Uterine leiomyomata - discussed options for management of AUB including OCPs, IUD and cyclic progesterone versus definitive therapy. Understands 1/1000 risk of leiomyosarcoma.  Reviewed ultrasound with Dr. Vargas-agreed for patient to follow up with endometrial biopsy. We will also draw labs " at this visit for aub.   Patient requested information on IUD options. Mirena and Kyleena both shown to patient and discussed risks including bleeding, pain, infection, and migration. Kyleena is smaller dosage of progesterone and is good for 5 years. Mirena is a small amount more or progesterone and is good for 8 years. Pamphlet given to patient. Discussed insertion procedure and follow up. Patient will call when she is on her period and has not had unprotected sex in last four weeks if she decides she wants IUD placement.    Patient will stop Micronor for now to rule out possibility of it changing her bleeding. Will use condoms.       Savanna Tate, APRN  10/04/2024

## 2024-10-07 ENCOUNTER — PROCEDURE VISIT (OUTPATIENT)
Dept: OBSTETRICS AND GYNECOLOGY | Facility: CLINIC | Age: 34
End: 2024-10-07
Payer: COMMERCIAL

## 2024-10-07 VITALS
WEIGHT: 150.6 LBS | BODY MASS INDEX: 26.68 KG/M2 | DIASTOLIC BLOOD PRESSURE: 74 MMHG | SYSTOLIC BLOOD PRESSURE: 114 MMHG | HEIGHT: 63 IN

## 2024-10-07 DIAGNOSIS — D25.1 INTRAMURAL LEIOMYOMA OF UTERUS: ICD-10-CM

## 2024-10-07 DIAGNOSIS — R93.89 THICKENED ENDOMETRIUM: ICD-10-CM

## 2024-10-07 DIAGNOSIS — N92.1 MENORRHAGIA WITH IRREGULAR CYCLE: Primary | ICD-10-CM

## 2024-10-07 DIAGNOSIS — N84.0 ENDOMETRIAL POLYP: ICD-10-CM

## 2024-10-07 PROCEDURE — 99213 OFFICE O/P EST LOW 20 MIN: CPT

## 2024-10-07 PROCEDURE — 58100 BIOPSY OF UTERUS LINING: CPT

## 2024-10-07 PROCEDURE — 81025 URINE PREGNANCY TEST: CPT

## 2024-10-07 NOTE — PROGRESS NOTES
Gynecologic Procedure Note        Endometrial Biopsy      Indications: Beatriz Dias is a 34 y.o. , who presents today for endometrial biopsy.  The patient was noted to have Menorrhagia and Fibroids and Endometrial Polyp .  Her LMP is Patient's last menstrual period was 2024 (exact date). . After being presented with the risk, benefits, and specific detail of the procedure, the patient wished to proceed.  Written consent was obtained from patient.   Urine pregnancy test was Negative. Patient does not have an allergy to betadine or shellfish.     Procedure Details     Time out: immediate members of the procedure team and patient agree to the following: correct patient, correct site, correct procedure to be performed. Savanna KAT/Mendoza SNIDER    The patient was placed on the table in the dorsal lithotomy position.  She was draped in the appropriate manner.  A speculum was placed in the vagina.  The cervix was visualized and prepped with Betadine.  A tenaculum was placed on the anterior lip of the cervix for traction.  A small plastic 5 mm Pipelle syringe curette was inserted into the cervical canal.  The uterus was sounded to 7 cm's.  A vigorous four quadrant biopsy was performed, removing a small amount of tissue.  The tissue was placed in Formalin and sent to Pathology.  The patient tolerated the procedure well and she reported moderate cramping.  The tenaculum was removed from the cervix and the speculum was removed.  The patient was observed for 10 minutes.           Complications: none.     Endometrial Biopsy    Date/Time: 10/7/2024 6:28 PM    Performed by: Savanna Tate APRN  Authorized by: Savanna Tate APRN    Consent:     Consent obtained: verbal and written    Consent given by: patient    Risks discussed: bleeding, damage to other organs, infection, need for repeat procedure and pain    Alternatives discussed: observation    Patient agrees, verbalizes understanding,  "and wants to proceed: yes    Indications:     Indications: thickened endometrium and abnormal bleeding from female genital tract    Pre-procedure:     Urine pregnancy test: negative      Premeds: NSAID    Procedure:     A bimanual exam was performed: no      Prepped with: Betadine    Tenaculum used: yes      A local block was performed: no      Local anesthetic: none    Cervix dilated: no      Number of passes: 3  Findings:     Cervix: normal      Specimen collected: specimen collected and sent to pathology      Patient tolerance: tolerated well, no immediate complications      Review of Systems   Genitourinary:  Positive for menstrual problem and pelvic pain.   All other systems reviewed and are negative.    /74   Ht 160 cm (62.99\")   Wt 68.3 kg (150 lb 9.6 oz)   LMP 09/30/2024 (Exact Date) Comment: still on cycle today  BMI 26.68 kg/m²       Plan:  Orders Placed This Encounter   Procedures    Endometrial Biopsy     This order was created via procedure documentation     Order Specific Question:   Release to patient     Answer:   Routine Release [[id number]]    TSH     Order Specific Question:   Release to patient     Answer:   Routine Release [[id number]]    T4, Free     Order Specific Question:   Release to patient     Answer:   Routine Release [[id number]]    Comprehensive Metabolic Panel     Order Specific Question:   Release to patient     Answer:   Routine Release [[id number]]    Hemoglobin A1c     Order Specific Question:   Release to patient     Answer:   Routine Release [[id number]]    HCG, B-subunit, Quantitative     Order Specific Question:   Release to patient     Answer:   Routine Release [[id number]]    POC Pregnancy, Urine     Order Specific Question:   Release to patient     Answer:   Routine Release [[id number]]    CBC & Differential     Order Specific Question:   Manual Differential     Answer:   No     Order Specific Question:   Release to patient     Answer:   Routine Release " [[id number]]       Problem List Items Addressed This Visit          Genitourinary and Reproductive     Menorrhagia with irregular cycle - Primary    Relevant Orders    POC Pregnancy, Urine (Completed)    TSH    T4, Free    CBC & Differential    Comprehensive Metabolic Panel    Hemoglobin A1c    HCG, B-subunit, Quantitative    TISSUE EXAM, [facility] ([facility])    Intramural leiomyoma of uterus    Overview     10/4/24 5.5mm x3.5mm x 5.3mm vol 0.054cm          Relevant Orders    POC Pregnancy, Urine (Completed)    TISSUE EXAM, [facility] ([facility])    Endometrial polyp    Overview     10/4/24 Heterogeneous with echogenic area seen with the [facility] 15 mm x 3mm x 13mm suggestive of a polyp. Endometrial thickness 6.0mm         Relevant Orders    POC Pregnancy, Urine (Completed)    TISSUE EXAM, [facility] ([facility])           Instructions  Call the office in 5 business days for biopsy results.  Patient instructed to call the office if develops a fever of 100.4 or greater, vaginal bleeding heavier than a period, foul vaginal discharge or pain.  If biopsy and labs are negative patient desires IUD placement.     Savanna Tate, APRN  10/07/2024

## 2024-10-08 LAB
ALBUMIN SERPL-MCNC: 4.9 G/DL (ref 3.9–4.9)
ALP SERPL-CCNC: 78 IU/L (ref 44–121)
ALT SERPL-CCNC: 27 IU/L (ref 0–32)
AST SERPL-CCNC: 20 IU/L (ref 0–40)
BASOPHILS # BLD AUTO: 0 X10E3/UL (ref 0–0.2)
BASOPHILS NFR BLD AUTO: 0 %
BILIRUB SERPL-MCNC: 0.2 MG/DL (ref 0–1.2)
BUN SERPL-MCNC: 14 MG/DL (ref 6–20)
BUN/CREAT SERPL: 19 (ref 9–23)
CALCIUM SERPL-MCNC: 9.1 MG/DL (ref 8.7–10.2)
CHLORIDE SERPL-SCNC: 102 MMOL/L (ref 96–106)
CO2 SERPL-SCNC: 20 MMOL/L (ref 20–29)
CREAT SERPL-MCNC: 0.74 MG/DL (ref 0.57–1)
EGFRCR SERPLBLD CKD-EPI 2021: 109 ML/MIN/1.73
EOSINOPHIL # BLD AUTO: 0.1 X10E3/UL (ref 0–0.4)
EOSINOPHIL NFR BLD AUTO: 2 %
ERYTHROCYTE [DISTWIDTH] IN BLOOD BY AUTOMATED COUNT: 11.8 % (ref 11.7–15.4)
GLOBULIN SER CALC-MCNC: 2.4 G/DL (ref 1.5–4.5)
GLUCOSE SERPL-MCNC: 86 MG/DL (ref 70–99)
HBA1C MFR BLD: 5.2 % (ref 4.8–5.6)
HCG INTACT+B SERPL-ACNC: <1 MIU/ML
HCT VFR BLD AUTO: 42.1 % (ref 34–46.6)
HGB BLD-MCNC: 14.1 G/DL (ref 11.1–15.9)
IMM GRANULOCYTES # BLD AUTO: 0 X10E3/UL (ref 0–0.1)
IMM GRANULOCYTES NFR BLD AUTO: 0 %
LYMPHOCYTES # BLD AUTO: 2 X10E3/UL (ref 0.7–3.1)
LYMPHOCYTES NFR BLD AUTO: 29 %
MCH RBC QN AUTO: 31.5 PG (ref 26.6–33)
MCHC RBC AUTO-ENTMCNC: 33.5 G/DL (ref 31.5–35.7)
MCV RBC AUTO: 94 FL (ref 79–97)
MONOCYTES # BLD AUTO: 0.6 X10E3/UL (ref 0.1–0.9)
MONOCYTES NFR BLD AUTO: 9 %
NEUTROPHILS # BLD AUTO: 4.3 X10E3/UL (ref 1.4–7)
NEUTROPHILS NFR BLD AUTO: 60 %
PLATELET # BLD AUTO: 255 X10E3/UL (ref 150–450)
POTASSIUM SERPL-SCNC: 3.9 MMOL/L (ref 3.5–5.2)
PROT SERPL-MCNC: 7.3 G/DL (ref 6–8.5)
RBC # BLD AUTO: 4.47 X10E6/UL (ref 3.77–5.28)
REF LAB TEST METHOD: NORMAL
SODIUM SERPL-SCNC: 138 MMOL/L (ref 134–144)
T4 FREE SERPL-MCNC: 1.42 NG/DL (ref 0.82–1.77)
TSH SERPL DL<=0.005 MIU/L-ACNC: 0.98 UIU/ML (ref 0.45–4.5)
WBC # BLD AUTO: 7.1 X10E3/UL (ref 3.4–10.8)

## 2024-11-06 ENCOUNTER — OFFICE VISIT (OUTPATIENT)
Dept: OBSTETRICS AND GYNECOLOGY | Facility: CLINIC | Age: 34
End: 2024-11-06
Payer: COMMERCIAL

## 2024-11-06 VITALS
WEIGHT: 148 LBS | BODY MASS INDEX: 26.22 KG/M2 | SYSTOLIC BLOOD PRESSURE: 122 MMHG | HEIGHT: 63 IN | DIASTOLIC BLOOD PRESSURE: 80 MMHG

## 2024-11-06 DIAGNOSIS — N92.1 MENORRHAGIA WITH IRREGULAR CYCLE: ICD-10-CM

## 2024-11-06 DIAGNOSIS — N84.0 ENDOMETRIAL POLYP: Primary | ICD-10-CM

## 2024-11-06 DIAGNOSIS — D25.1 INTRAMURAL LEIOMYOMA OF UTERUS: ICD-10-CM

## 2024-11-06 NOTE — PROGRESS NOTES
Chief Complaint   Patient presents with    Follow-up       Subjective   HPI  Beatriz Dias is a 34 y.o. female, . Her last LMP was Patient's last menstrual period was 10/26/2024.. who presents for follow up. She was initially seen  for menorrhagia with irregular periods. She was previously on NuvaRing from . While using this she noticed worsening heart palpitations. She never reinserted a new one in  and the palpitations stopped. While using NuvaRing she had regular periods. Since stopping she reports menorrhagia with irregular cycles, occurring every 2 weeks and lasting 7-10 days with a heavy flow. She was going to start on a POP (due to her sister being hospitalized after a blood clot suspected to be estrogen induced), but followed up for an US 10/4 and was told to d/c it. 10/4 her US showed a fibroids measuring 5.5 x 3.5 x 5.3mm and a probable endometrial polyp measuring 15 x 3 x 13mm. She returned to clinic 10/7 for embx which was benign. At that time the plan was to return for IUD placement. After some time, the patient decided she wanted to discuss surgical management of the polyp/fibroid before proceeding with the IUD to rule out any cancerous tissue.     Over the last month she started her period 10/26 with severe pain but the period was only 2 days but with a heavy flow. Since has not had any bleeding since then. She is not using any other form of birth control at this time.      Additional OB/GYN History     Last Pap :   Last Completed Pap Smear            PAP SMEAR (Every 3 Years) Next due on 2023  LIQUID-BASED PAP SMEAR, [facility] ([facility])                    Last mammogram:   Last Completed Mammogram       This patient has no relevant Health Maintenance data.            Tobacco Usage?: No   OB History          4    Para   3    Term   3       0    AB   1    Living   3         SAB   1    IAB   0    Ectopic   0    Molar  "  0    Multiple   0    Live Births   3                No current outpatient medications on file.     Past Medical History:   Diagnosis Date    Irregular menstrual cycle         Past Surgical History:   Procedure Laterality Date     SECTION      x2       The additional following portions of the patient's history were reviewed and updated as appropriate: allergies, current medications, past family history, past medical history, past social history, past surgical history, and problem list.    Review of Systems   Constitutional: Negative.    HENT: Negative.     Eyes: Negative.    Respiratory: Negative.     Cardiovascular: Negative.    Gastrointestinal: Negative.    Endocrine: Negative.    Genitourinary:  Positive for menstrual problem.   Musculoskeletal: Negative.    Skin: Negative.    Allergic/Immunologic: Negative.    Neurological: Negative.    Hematological: Negative.    Psychiatric/Behavioral: Negative.         I have reviewed and agree with the HPI, ROS, and historical information as entered above. Bina Vargas MD      Objective   /80   Ht 160 cm (63\")   Wt 67.1 kg (148 lb)   LMP 10/26/2024   BMI 26.22 kg/m²     Physical Exam  Vitals and nursing note reviewed.   Constitutional:       Appearance: Normal appearance. She is well-developed.   HENT:      Head: Normocephalic and atraumatic.   Pulmonary:      Effort: Pulmonary effort is normal.      Breath sounds: Normal breath sounds.   Abdominal:      General: There is no distension.      Palpations: Abdomen is soft. Abdomen is not rigid. There is no mass.      Tenderness: There is no abdominal tenderness. There is no guarding or rebound.   Musculoskeletal:      Cervical back: Normal range of motion.   Skin:     General: Skin is warm and dry.   Neurological:      Mental Status: She is alert and oriented to person, place, and time.   Psychiatric:         Mood and Affect: Mood normal.         Behavior: Behavior normal.         Assessment & Plan "     Assessment     Problem List Items Addressed This Visit          Genitourinary and Reproductive     Menorrhagia with irregular cycle    Overview     11/6/2024-patient came off NuvaRing due to heart palpitations and was having menorrhagia with irregular cycles.  Seen by nurse practitioner which natalie labs that were within normal limits.  Ultrasound was performed and the patient had a very small endometrial polyp measuring 15 x 3 x 13 mm.  She also had a small intramural fibroid measuring 5 x 3 x 5 mm.  Patient returning to clinic today to discuss treatment options.  We discussed options including D&C, hysterectomy, cycling with progestin only pills, IUD.  Patient would like to proceed with progestin only pills for the next 3 months.  At that time we will repeat an ultrasound.  If the ultrasound is within normal limits without the endometrial polyp, patient will decide whether she would like to continue with progestin only pills or have a Mirena placed.  If a polyp is still present, we will proceed with a dilation and curettage.         Intramural leiomyoma of uterus    Overview     10/4/24 5.5mm x3.5mm x 5.3mm vol 0.054cm          Endometrial polyp - Primary    Overview     10/4/24 Heterogeneous with echogenic area seen with the [facility] 15 mm x 3mm x 13mm suggestive of a polyp. Endometrial thickness 6.0mm         Relevant Orders    US Non-ob Transvaginal         Plan     Start Micronor with her next bleeding cycle.  Return in about 3 months (around 2/6/2025) for ultrasound.        Bina Vargas MD  11/06/2024

## 2025-03-03 ENCOUNTER — OFFICE VISIT (OUTPATIENT)
Dept: OBSTETRICS AND GYNECOLOGY | Facility: CLINIC | Age: 35
End: 2025-03-03

## 2025-03-03 ENCOUNTER — TELEPHONE (OUTPATIENT)
Dept: OBSTETRICS AND GYNECOLOGY | Facility: CLINIC | Age: 35
End: 2025-03-03

## 2025-03-03 VITALS — DIASTOLIC BLOOD PRESSURE: 74 MMHG | SYSTOLIC BLOOD PRESSURE: 120 MMHG | BODY MASS INDEX: 26.93 KG/M2 | WEIGHT: 152 LBS

## 2025-03-03 DIAGNOSIS — Z01.419 WOMEN'S ANNUAL ROUTINE GYNECOLOGICAL EXAMINATION: Primary | ICD-10-CM

## 2025-03-03 DIAGNOSIS — N83.8 PARATUBAL CYST: ICD-10-CM

## 2025-03-03 DIAGNOSIS — N92.1 MENORRHAGIA WITH IRREGULAR CYCLE: ICD-10-CM

## 2025-03-03 DIAGNOSIS — R10.2 PELVIC PAIN: ICD-10-CM

## 2025-03-03 DIAGNOSIS — D25.1 INTRAMURAL LEIOMYOMA OF UTERUS: ICD-10-CM

## 2025-03-03 DIAGNOSIS — N84.0 ENDOMETRIAL POLYP: ICD-10-CM

## 2025-03-03 RX ORDER — ACETAMINOPHEN AND CODEINE PHOSPHATE 120; 12 MG/5ML; MG/5ML
SOLUTION ORAL
COMMUNITY
Start: 2025-03-01 | End: 2025-03-03

## 2025-03-03 RX ORDER — DROSPIRENONE 4 MG/1
1 TABLET, FILM COATED ORAL DAILY
Qty: 84 TABLET | Refills: 4 | Status: SHIPPED | OUTPATIENT
Start: 2025-03-03

## 2025-03-03 NOTE — PROGRESS NOTES
Gynecologic Annual Exam Note        Gynecologic Exam        Subjective     HPI  Beatriz Dias is a 34 y.o.  female who presents for annual well woman exam as a established patient. There were no changes to her medical or surgical history since her last visit.. Patient's last menstrual period was 2025 (exact date).  Her periods are regular every 18-21 days  The flow is dependent on the month, she reports sometimes light and others very heavy. She reports dysmenorrhea is mild occurring first 1-2 days of flow. Marital Status: .  She is sexually active. She has not had new partners. STD testing recommendations have been explained to the patient and she does not desire STD testing.    The patient would like to discuss the following complaints today: US for polyp follow up. Patient reports increase in bloating and left ovarian pain that is intermittent, sudden onset, causes her to double over. Described as stabbing 8/10, lasted </=10 secs. She reports most recent period was 7 days long, her cycle was 18 days. She denies missing any pills.      Micronor initiated 2024. She states Micronor has not improved heavy flow. She states one month the flow is normal and the next month it will be heavy. She would like to try another medication for intervention.     Additional OB/GYN History   contraceptive methods: OCP (estrogen/progesterone)  Desires to: continue contraception  Thromboembolic Disease: none  History of migraines: yes without aura  History of STD: no    Last Pap : 23. Results: negative. HPV: negative.   Last Completed Pap Smear            PAP SMEAR (Every 3 Years) Next due on 2023  LIQUID-BASED PAP SMEAR, [facility] ([facility])                     History of abnormal Pap smear: no  Gardasil status:completed  Family history of uterine, colon, breast, or ovarian cancer: no  Performs monthly Self-Breast Exam: no  Exercises Regularly:yes  Feelings of Anxiety  or Depression: no  Tobacco Usage?: No       Current Outpatient Medications:     Drospirenone (Slynd) 4 MG tablet, Take 1 tablet by mouth Daily., Disp: 84 tablet, Rfl: 4     Patient is requesting refills of OCP.    OB History          4    Para   3    Term   3       0    AB   1    Living   3         SAB   1    IAB   0    Ectopic   0    Molar   0    Multiple   0    Live Births   3                Health Maintenance   Topic Date Due    BMI FOLLOWUP  Never done    TDAP/TD VACCINES (1 - Tdap) Never done    ANNUAL PHYSICAL  Never done    INFLUENZA VACCINE  2024    COVID-19 Vaccine ( - - season) Never done    Annual Gynecologic Pelvic and Breast Exam  2025    PAP SMEAR  2026    HEPATITIS C SCREENING  Completed    Pneumococcal Vaccine 0-49  Aged Out       Past Medical History:   Diagnosis Date    Fibroid About 2 ton3 months ago    Lately  have been experiencing strong sharp stabbing pains on the left side ovarie and scary heart palpitations.    Irregular menstrual cycle     Paratubal cyst 3/3/2025        Past Surgical History:   Procedure Laterality Date     SECTION      x2    WISDOM TOOTH EXTRACTION  Last year       The additional following portions of the patient's history were reviewed and updated as appropriate: allergies, current medications, past family history, past medical history, past social history, and past surgical history.    Review of Systems   Respiratory: Negative.  Negative for shortness of breath.    Cardiovascular: Negative.  Positive for palpitations. Negative for chest pain.   Gastrointestinal: Negative.  Negative for abdominal distention, abdominal pain and constipation.   Genitourinary:  Positive for menstrual problem and pelvic pain (left side). Negative for breast discharge, breast lump, breast pain, dyspareunia, dysuria, pelvic pressure, urinary incontinence, vaginal bleeding, vaginal discharge and vaginal pain.   Psychiatric/Behavioral: Negative.   Negative for depressed mood. The patient is not nervous/anxious.          I have reviewed and agree with the HPI, ROS, and historical information as entered above. Eboni Rome, APRN          Objective   /74   Wt 68.9 kg (152 lb)   LMP 02/17/2025 (Exact Date)   BMI 26.93 kg/m²     Physical Exam  Vitals and nursing note reviewed. Exam conducted with a chaperone present.   Constitutional:       General: She is not in acute distress.     Appearance: Normal appearance. She is well-developed. She is not ill-appearing or toxic-appearing.   HENT:      Head: Normocephalic and atraumatic.   Pulmonary:      Effort: Pulmonary effort is normal. No retractions.   Chest:      Chest wall: No mass.   Breasts:     Breasts are symmetrical.      Right: Normal. No mass, nipple discharge, skin change or tenderness.      Left: Normal. No mass, nipple discharge, skin change or tenderness.   Abdominal:      Palpations: Abdomen is soft. Abdomen is not rigid. There is no mass.      Tenderness: There is no abdominal tenderness. There is no guarding or rebound.      Hernia: No hernia is present. There is no hernia in the left inguinal area or right inguinal area.   Genitourinary:     General: Normal vulva.      Labia:         Right: No rash, tenderness or lesion.         Left: No rash, tenderness or lesion.       Vagina: Normal. No vaginal discharge, erythema, tenderness, bleeding or lesions.      Cervix: No cervical motion tenderness, discharge, friability, lesion, erythema or cervical bleeding.      Uterus: Normal. Not enlarged, not fixed and not tender.       Adnexa: Right adnexa normal and left adnexa normal.        Right: No mass or tenderness.          Left: No mass or tenderness.        Rectum: No external hemorrhoid.   Lymphadenopathy:      Upper Body:      Right upper body: No supraclavicular or axillary adenopathy.      Left upper body: No supraclavicular or axillary adenopathy.   Neurological:      Mental Status: She is  alert and oriented to person, place, and time.   Psychiatric:         Mood and Affect: Mood normal.         Behavior: Behavior normal.            Assessment and Plan    Problem List Items Addressed This Visit       Endometrial polyp    Overview     10/4/24 Heterogeneous with echogenic area seen with the [facility] 15 mm x 3mm x 13mm suggestive of a polyp. Endometrial thickness 6.0mm    3/3/2025- Resolved.          Intramural leiomyoma of uterus    Overview     10/4/24 5.5mm x3.5mm x 5.3mm vol 0.054cm   3/3/2025 5.6mm x 2.9mm x 5.4mm. Small anterior intramural fibroid. - stable         Relevant Medications    Drospirenone (Slynd) 4 MG tablet    Menorrhagia with irregular cycle    Overview     11/6/2024-patient came off NuvaRing due to heart palpitations and was having menorrhagia with irregular cycles.  Seen by nurse practitioner which natalie labs that were within normal limits.  Ultrasound was performed and the patient had a very small endometrial polyp measuring 15 x 3 x 13 mm.  She also had a small intramural fibroid measuring 5 x 3 x 5 mm.  Patient returning to clinic today to discuss treatment options.  We discussed options including D&C, hysterectomy, cycling with progestin only pills, IUD.  Patient would like to proceed with progestin only pills for the next 3 months.  At that time we will repeat an ultrasound.  If the ultrasound is within normal limits without the endometrial polyp, patient will decide whether she would like to continue with progestin only pills or have a Mirena placed.  If a polyp is still present, we will proceed with a dilation and curettage.    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 Endometrium trilaminar. EMT 5.3mm. Polyp resolved.          Relevant Medications    Drospirenone (Slynd) 4 MG tablet    Paratubal cyst    Overview     3/3/2025- Small para tubal cyst seen measuring 10 byx 11 x 12 mm. Asymptomatic.     Patient informed most ovarian cysts are  typically not harmful, don’t cause symptoms, and are not indicative of risk for future ovarian cancer; however, there is a small chance that any ovarian mass can be cancerous. Malignancy can be excluded if surgery is performed and tissue is evaluated.     Treatment options discussed including expectant management (low suspicion for malignancy), surveillance w/ f/u pelvic ultrasounds, and surgery if she desires histologic diagnosis or if she has persistent pain or other symptoms.    She desires surveillance with f/u TVUS in 6 weeks. Rupture precautions reviewed.          Relevant Orders    US Non-ob Transvaginal     Other Visit Diagnoses       Women's annual routine gynecological examination    -  Primary    Pelvic pain                GYN annual well woman exam.   Reviewed pap guidelines. Pap pending w/ STI testing.   Encouraged use of condoms for STD prevention.  Paratubal cyst- see plan above.   LLQ pelvic pain. Potential etiologies reviewed. Advised to maintain good bowel regimen. F/U w/ PCP if pain persists or worsens.   Menorrhagia treatment options reviewed. She does not desires IUD at this time. She will continue this last pack of Micronor and then begin Slynd as directed. We discussed side effects of nausea, BTB, headaches, breast tenderness and slight weight gain in the first three cycles.  She understands risks of blood clots, stroke, and theoretical risk of breast cancer.  Denies family history of blood clots. Slynd prescribed.   Reviewed monthly self breast exams.  Instructed to call with lumps, pain, or breast discharge.    Reviewed exercise as a preventative health measures.   Reccommended Flu Vaccine in Fall of each year.  Slynd and ov cyst education included in patient instructions.   Return in about 6 weeks (around 4/14/2025) for Ultrasound- f/u paraovarian cyst- right side.    Eboni Rome, APRN  03/03/2025

## 2025-03-03 NOTE — TELEPHONE ENCOUNTER
(Nair: [id number])  PA Case ID #: [id number]  Rx #: [id number] Drug  Slynd 4MG tablets Form  [employer] Electronic PA Form Original Claim Info  7J Patient Relationship Code Value Not Supported     Micronor initiated 9/16/2024. She states Micronor has not improved heavy flow. She states one month the flow is normal and the next month it will be heavy. She would like to try another medication for intervention.     Outcome  Approved today by [employer] 2017  CaseId:[id number];Status:Approved;Review Type:Prior Auth;Coverage Start Date:02/01/2025;Coverage End Date:03/03/2026;  Effective Date: 1/31/2025  Authorization Expiration Date: 3/2/2026

## 2025-03-04 LAB — REF LAB TEST METHOD: NORMAL

## 2025-03-17 ENCOUNTER — LAB (OUTPATIENT)
Dept: LAB | Facility: HOSPITAL | Age: 35
End: 2025-03-17
Payer: COMMERCIAL

## 2025-03-17 ENCOUNTER — OFFICE VISIT (OUTPATIENT)
Dept: FAMILY MEDICINE CLINIC | Facility: CLINIC | Age: 35
End: 2025-03-17
Payer: COMMERCIAL

## 2025-03-17 VITALS
WEIGHT: 152 LBS | OXYGEN SATURATION: 98 % | HEART RATE: 78 BPM | HEIGHT: 63 IN | SYSTOLIC BLOOD PRESSURE: 100 MMHG | RESPIRATION RATE: 20 BRPM | BODY MASS INDEX: 26.93 KG/M2 | TEMPERATURE: 97.1 F | DIASTOLIC BLOOD PRESSURE: 60 MMHG

## 2025-03-17 DIAGNOSIS — R00.2 PALPITATIONS: ICD-10-CM

## 2025-03-17 DIAGNOSIS — Z00.00 ANNUAL PHYSICAL EXAM: Primary | ICD-10-CM

## 2025-03-17 LAB
ALBUMIN SERPL-MCNC: 4.6 G/DL (ref 3.5–5.2)
ALBUMIN/GLOB SERPL: 1.7 G/DL
ALP SERPL-CCNC: 82 U/L (ref 39–117)
ALT SERPL W P-5'-P-CCNC: 21 U/L (ref 1–33)
ANION GAP SERPL CALCULATED.3IONS-SCNC: 11.2 MMOL/L (ref 5–15)
AST SERPL-CCNC: 21 U/L (ref 1–32)
BASOPHILS # BLD AUTO: 0.03 10*3/MM3 (ref 0–0.2)
BASOPHILS NFR BLD AUTO: 0.6 % (ref 0–1.5)
BILIRUB SERPL-MCNC: 0.3 MG/DL (ref 0–1.2)
BUN SERPL-MCNC: 14 MG/DL (ref 6–20)
BUN/CREAT SERPL: 18.2 (ref 7–25)
CALCIUM SPEC-SCNC: 9.2 MG/DL (ref 8.6–10.5)
CHLORIDE SERPL-SCNC: 101 MMOL/L (ref 98–107)
CO2 SERPL-SCNC: 24.8 MMOL/L (ref 22–29)
CREAT SERPL-MCNC: 0.77 MG/DL (ref 0.57–1)
DEPRECATED RDW RBC AUTO: 42.2 FL (ref 37–54)
EGFRCR SERPLBLD CKD-EPI 2021: 104.6 ML/MIN/1.73
EOSINOPHIL # BLD AUTO: 0.06 10*3/MM3 (ref 0–0.4)
EOSINOPHIL NFR BLD AUTO: 1.1 % (ref 0.3–6.2)
ERYTHROCYTE [DISTWIDTH] IN BLOOD BY AUTOMATED COUNT: 12 % (ref 12.3–15.4)
GLOBULIN UR ELPH-MCNC: 2.7 GM/DL
GLUCOSE SERPL-MCNC: 89 MG/DL (ref 65–99)
HCT VFR BLD AUTO: 41.7 % (ref 34–46.6)
HGB BLD-MCNC: 14 G/DL (ref 12–15.9)
IMM GRANULOCYTES # BLD AUTO: 0.01 10*3/MM3 (ref 0–0.05)
IMM GRANULOCYTES NFR BLD AUTO: 0.2 % (ref 0–0.5)
LYMPHOCYTES # BLD AUTO: 1.84 10*3/MM3 (ref 0.7–3.1)
LYMPHOCYTES NFR BLD AUTO: 33.8 % (ref 19.6–45.3)
MCH RBC QN AUTO: 32 PG (ref 26.6–33)
MCHC RBC AUTO-ENTMCNC: 33.6 G/DL (ref 31.5–35.7)
MCV RBC AUTO: 95.2 FL (ref 79–97)
MONOCYTES # BLD AUTO: 0.48 10*3/MM3 (ref 0.1–0.9)
MONOCYTES NFR BLD AUTO: 8.8 % (ref 5–12)
NEUTROPHILS NFR BLD AUTO: 3.02 10*3/MM3 (ref 1.7–7)
NEUTROPHILS NFR BLD AUTO: 55.5 % (ref 42.7–76)
NRBC BLD AUTO-RTO: 0 /100 WBC (ref 0–0.2)
PLATELET # BLD AUTO: 246 10*3/MM3 (ref 140–450)
PMV BLD AUTO: 10.5 FL (ref 6–12)
POTASSIUM SERPL-SCNC: 3.8 MMOL/L (ref 3.5–5.2)
PROT SERPL-MCNC: 7.3 G/DL (ref 6–8.5)
RBC # BLD AUTO: 4.38 10*6/MM3 (ref 3.77–5.28)
SODIUM SERPL-SCNC: 137 MMOL/L (ref 136–145)
TSH SERPL DL<=0.05 MIU/L-ACNC: 1.06 UIU/ML (ref 0.27–4.2)
WBC NRBC COR # BLD AUTO: 5.44 10*3/MM3 (ref 3.4–10.8)

## 2025-03-17 PROCEDURE — 80050 GENERAL HEALTH PANEL: CPT

## 2025-03-17 PROCEDURE — 36415 COLL VENOUS BLD VENIPUNCTURE: CPT

## 2025-03-17 PROCEDURE — 99385 PREV VISIT NEW AGE 18-39: CPT | Performed by: HOSPITALIST

## 2025-03-17 NOTE — ASSESSMENT & PLAN NOTE
She reports experiencing heart palpitations almost daily, with a particularly severe episode occurring about 2 weeks ago, which included dizziness and the sensation of needing to catch her breath. Given her family history and persistent symptoms, further evaluation is warranted. A Holter monitor will be ordered for continuous monitoring of her heart rate over a period of 48 hours. She will be referred to a cardiologist for further evaluation. Basic laboratory tests, including thyroid function, kidney function, liver function, and blood counts, will be conducted. An echocardiogram will also be scheduled to assess her heart valves and overall cardiac function.

## 2025-03-17 NOTE — PROGRESS NOTES
Female Physical Note      Date: 2025   Patient Name: Beatriz Dias  : 1990   MRN: [id number]     Chief Complaint:    Chief Complaint   Patient presents with    [facility]    Annual Exam    Palpitations       History of Present Illness: Beatriz Dias is a 34 y.o. female who is here today for their annual health maintenance and physical.     History of Present Illness  The patient is a 34-year-old female who presents for an annual exam and heart palpitations.    She reports experiencing heart palpitations, a condition that appears to be familial as her mother and sister also suffer from it. The most recent episode occurred approximately 2 weeks ago and was accompanied by dizziness, a symptom she had not previously associated with her palpitations. This episode was particularly severe, lasting the entire day, and causing significant distress. She describes the sensation as if her heart is pounding out of her chest, with a rapid heartbeat that leaves her feeling breathless. These episodes can occur even during periods of rest, such as when lying down. She has been experiencing these palpitations daily but notes that they are not always severe. She recalls a similar episode during one of her pregnancies, which required hospitalization and monitoring for 5 minutes, although no abnormalities were detected at that time. She has never undergone an echocardiogram. She also mentions that during her last two pregnancies, she experienced cardiac issues that necessitated early delivery due to breathing difficulties and palpitations. She reports no history of anemia or low iron levels during her pregnancies. She expresses a desire to establish care and maintain her current state of health. She has been engaging in regular exercise, including gym workouts and running, which she believes have been beneficial.        Subjective      Review of Systems:   Review of Systems    Past Medical  History, Social History, Family History and Care Team were all reviewed with patient and updated as appropriate.     Medications:     Current Outpatient Medications:     Drospirenone (Slynd) 4 MG tablet, Take 1 tablet by mouth Daily. (Patient not taking: Reported on 3/17/2025), Disp: 84 tablet, Rfl: 4    Allergies:   No Known Allergies    Immunizations:  Health Maintenance Summary            Upcoming       TDAP/TD VACCINES (1 - Tdap) Postponed until 3/17/2025      03/17/2025  Postponed until 3/17/2025 by Roxanne Thompson DO (Pending event)              INFLUENZA VACCINE (Yearly - July to March) Postponed until 3/31/2025      03/17/2025  Postponed until 3/31/2025 by Erin Richter MA (Patient Refused)    06/08/2013  Imm Admin: Influenza Seasonal Injectable              ANNUAL PHYSICAL (Yearly) Next due on 3/17/2026      03/17/2025  Done              BMI FOLLOWUP (Yearly) Next due on 3/17/2026      03/17/2025  [employer]: WORKFLOW - QUALITY MEASUREMENT - DOCUMENTED WEIGHT FOLLOW-UP PLAN              PAP SMEAR (Every 3 Years) Next due on 3/3/2028      03/03/2025  LIQUID-BASED PAP SMEAR WITH HPV GENOTYPING IF ASCUS ([facility])    02/09/2023  LIQUID-BASED PAP SMEAR, [facility] ([facility])                      Completed or No Longer Recommended       HEPATITIS C SCREENING  Completed      07/19/2019  Hepatitis Panel, Acute              COVID-19 Vaccine (Series Information) Completed      03/03/2025  Imm Admin: COVID-19 (UNSPECIFIED)              Pneumococcal Vaccine 0-49 (Series Information) Aged Out     No completion, postpone, or frequency change history exists for this topic.                             No orders of the defined types were placed in this encounter.       Colorectal Screening:   NA   Last Completed Colonoscopy    This patient has no relevant Health Maintenance data.       Pap:  per OBGYN   Last Completed Pap Smear            Upcoming       PAP SMEAR (Every 3 Years) Next due on 3/3/2028       "03/03/2025  LIQUID-BASED PAP SMEAR WITH HPV GENOTYPING IF ASCUS ([facility])    02/09/2023  LIQUID-BASED PAP SMEAR, [facility] ([facility])                           Mammogram:  NA   Last Completed Mammogram    This patient has no relevant Health Maintenance data.          CT for Smoker (Age 50-80, 20 pk yr):   NA  Bone Density/DEXA (Age 65 or high risk): NA  Hep C (Age 18-79 once): nonreactive in 2019  HIV (Age 15-65 once): No results found for: \"HIV1X2\"  A1c:   Hemoglobin A1C   Date Value Ref Range Status   10/07/2024 5.2 4.8 - 5.6 % Final     Comment:              Prediabetes: 5.7 - 6.4           Diabetes: >6.4           Glycemic control for adults with diabetes: <7.0     07/18/2019 5.17 4.80 - 5.60 % Final      Lipid panel:  No results found for: \"LIPIDEXCLUSI\"    The ASCVD Risk score (Basilio [place], et al., 2019) failed to calculate for the following reasons:    The 2019 ASCVD risk score is only valid for ages 40 to 79    Dermatology: no  Ophthalmologist: no  Dentist: yes    Tobacco Use: Low Risk  (3/17/2025)    Patient History     Smoking Tobacco Use: Never     Smokeless Tobacco Use: Never     Passive Exposure: Never       Social History     Substance and Sexual Activity   Alcohol Use Yes    Alcohol/week: 2.0 standard drinks of alcohol    Types: 2 Glasses of wine per week    Comment: Very rarely        Social History     Substance and Sexual Activity   Drug Use Never        Diet/Physical activity:healthy; normal    Menopause: no  Menstrual Cycles: normal    Depression: PHQ-2 Depression Screening  PHQ-9 Total Score:      Measures:   Advanced Care Planning:   Patient does not have an advance directive, declines further assistance.    Smoking Cessation:   NA    Objective     Physical Exam:  Vital Signs:   Vitals:    03/17/25 1016   BP: 100/60   BP Location: Left arm   Patient Position: Sitting   Cuff Size: Adult   Pulse: 78   Resp: 20   Temp: 97.1 °F (36.2 °C)   TempSrc: Temporal   SpO2: 98%   Weight: 68.9 kg (152 " "lb)   Height: 160 cm (62.99\")   PainSc: 0-No pain     Facility age limit for growth %mitali is 20 years.  Body mass index is 26.93 kg/m².     Physical Exam  Vitals and nursing note reviewed.   Constitutional:       Appearance: Normal appearance.   HENT:      Head: Normocephalic and atraumatic.      Mouth/Throat:      Mouth: Mucous membranes are moist.   Eyes:      Pupils: Pupils are equal, round, and reactive to light.   Cardiovascular:      Rate and Rhythm: Normal rate and regular rhythm.      Heart sounds: Normal heart sounds.   Pulmonary:      Effort: Pulmonary effort is normal.      Breath sounds: Normal breath sounds.   Abdominal:      General: Bowel sounds are normal.      Palpations: Abdomen is soft.   Musculoskeletal:         General: Normal range of motion.      Cervical back: Normal range of motion.   Skin:     General: Skin is warm and dry.   Neurological:      General: No focal deficit present.      Mental Status: She is alert and oriented to person, place, and time.   Psychiatric:         Mood and Affect: Mood normal.         Behavior: Behavior normal.         POCT Results (if applicable);   Results for orders placed or performed in visit on 25   LIQUID-BASED PAP SMEAR WITH HPV GENOTYPING IF ASCUS ([facility])    Collection Time: 25  5:51 PM    Specimen: ThinPrep Vial   Result Value Ref Range    Reference Lab Report       [facility]  [address] [place] [address]  Phone: [phone] or [phone]  Fax: [phone]  Ramirez Tanner M.D., Medical Director    PATIENT NAME                           LABORATORY [address]  [id number]  CORRINA SEQUEIRA               [id number]  [id number]                         AGE              SEX  SSN           CLIENT REF #  [id number]                         34      1990  F    [id number]   [id number]    [id number]         REQUESTING M.D.     [place] [address]              "   CECILIO SAHU  DATE COLLECTED      DATE RECEIVED      DATE REPORTED  03/03/2025 03/03/2025 03/04/2025    ThinPrep Pap with [facility]    DIAGNOSIS:  Negative for intraepithelial lesion or malignancy    Multiple factors can influence accuracy of Pap tests; therefore, screening at  regular intervals is necessary for early cancer detection.      SPECIMEN ADEQUACY:  SATISFACTORY FOR  EVALUATION  Transformation zone is present.  SOURCE OF SPECIMEN:  CERVICAL/ENDOCERVICAL  SLIDES:  1  CLINICAL HISTORY:  Women's annual routine gynecological examination,  POLYP, Pelvic pain, Paratubal cyst    Chlamydia / Gonorrhea  CHLAMYDIA TRACHOMATIS: Negative  NEISSERIA GONORRHOEAE: Negative    The Aptima Combo 2 assay is a target amplification nucleic acid probe test that  utilizes target capture for the in vitro qualitative detection and differentiation of  ribosomal RNA from Chlamydia trachomatis and Neisseria gonorrhoeae to aid  in the diagnosis of chlamydial and gonococcal disease using the [place] system.    CYTOTECHNOLOGIST:      YO PIMENTEL (ASCP)    CPT CODES:  90067, 40000, 85110          Procedures    Assessment / Plan      Assessment/Plan:     Diagnoses and all orders for this visit:    1. Annual physical exam (Primary)  Assessment & Plan:  Annual wellness exam completed today. Health Maintenance including immunizations was updated and reflected in the chart. Yearly screening labs were ordered.     Further recommendations to be given once lab data received.     Health advice: healthy food choices with fresh fruits and vegetables, maintain sleep pattern at least 8 hours, avoid texting and distracted driving practices; wear safety belt, engage in regular exercise, maintain healthy weight, use safe sex practices, avoid alcohol and illicit drugs. Maintain immunizations that are up to date. Maintain health maintenance:PAP, etc.  Follow up with PCP if struggling with depression or anxiety. Keep regular dental  and eye exams. Brush and floss teeth daily.     I suggest they take a daily multivitamin that is age-appropriate (example women's One-A-Day.)  Patient voiced understanding of these instructions.     Follow up Annually for Physical         2. Palpitations  Assessment & Plan:  She reports experiencing heart palpitations almost daily, with a particularly severe episode occurring about 2 weeks ago, which included dizziness and the sensation of needing to catch her breath. Given her family history and persistent symptoms, further evaluation is warranted. A Holter monitor will be ordered for continuous monitoring of her heart rate over a period of 48 hours. She will be referred to a cardiologist for further evaluation. Basic laboratory tests, including thyroid function, kidney function, liver function, and blood counts, will be conducted. An echocardiogram will also be scheduled to assess her heart valves and overall cardiac function.    Orders:  -     Holter monitor - 48 hour; Future  -     CBC & Differential; Future  -     Comprehensive metabolic panel; Future  -     TSH Rfx On Abnormal To Free T4; Future  -     Ambulatory Referral to Cardiology  -     Adult Transthoracic Echo Complete W/ Cont if Necessary Per Protocol; Future         Healthcare Maintenance:  Counseling provided based on age appropriate USPSTF guidelines.  BMI is >= 25 and <30. (Overweight) The following options were offered after discussion;: weight loss educational material (shared in after visit summary)    Beatriz Dias voices understanding and acceptance of this advice and will call back with any further questions or concerns. AVS with preventive healthcare tips printed for patient.       Follow Up:   Return in about 6 weeks (around 4/28/2025) for palpitations.      Patient or patient representative verbalized consent for the use of Ambient Listening during the visit with  Roxanne Thompson DO for chart documentation. 3/17/2025  11:19  EDT      Roxanne Thompson,   [facility]- [facility]

## 2025-04-22 DIAGNOSIS — Z30.41 SURVEILLANCE FOR BIRTH CONTROL, ORAL CONTRACEPTIVES: Primary | ICD-10-CM

## 2025-04-22 DIAGNOSIS — D25.1 INTRAMURAL LEIOMYOMA OF UTERUS: ICD-10-CM

## 2025-04-22 DIAGNOSIS — N92.1 MENORRHAGIA WITH IRREGULAR CYCLE: ICD-10-CM

## 2025-04-22 RX ORDER — DROSPIRENONE 4 MG/1
1 TABLET, FILM COATED ORAL DAILY
Qty: 84 TABLET | Refills: 4 | OUTPATIENT
Start: 2025-04-22

## 2025-04-22 RX ORDER — DROSPIRENONE 4 MG/1
1 TABLET, FILM COATED ORAL DAILY
Qty: 84 TABLET | Refills: 0 | Status: SHIPPED | OUTPATIENT
Start: 2025-04-22

## 2025-06-12 ENCOUNTER — OFFICE VISIT (OUTPATIENT)
Dept: OBSTETRICS AND GYNECOLOGY | Facility: CLINIC | Age: 35
End: 2025-06-12
Payer: COMMERCIAL

## 2025-06-12 VITALS — BODY MASS INDEX: 25.48 KG/M2 | WEIGHT: 143.8 LBS | HEIGHT: 63 IN

## 2025-06-12 DIAGNOSIS — N92.1 MENORRHAGIA WITH IRREGULAR CYCLE: ICD-10-CM

## 2025-06-12 DIAGNOSIS — N83.8 PARATUBAL CYST: Primary | ICD-10-CM

## 2025-06-12 DIAGNOSIS — R10.2 VAGINAL PAIN: ICD-10-CM

## 2025-06-12 DIAGNOSIS — N94.10 FEMALE DYSPAREUNIA: ICD-10-CM

## 2025-06-12 DIAGNOSIS — D25.1 INTRAMURAL LEIOMYOMA OF UTERUS: ICD-10-CM

## 2025-06-12 NOTE — PROGRESS NOTES
Chief Complaint   Patient presents with    Follow-up         Subjective   HPI  Beatriz Dias is a 34 y.o. female, , who presents for follow up evaluation of right paratubal cyst and AUB.      She was last seen on 3/3/2025 by JENNIFER hartmann for annual exam. Her last LMP was Patient's last menstrual period was 2025 (exact date). Before the period on , she did not have period for 2 months prior. After intercourse on the same day  she started her period, lasted 4 days, 2 days of heavy flow (changing pad 3-4 times a day). Pt has not missed any pills for slynd. Pt was taking micronor in 2024 and then was changed to slynd in 3/3/2025. Pt states she is satisfied with the slynd.     She was last seen by Dr. Vargas on 2024. At that time the plan was expectant management for management of the cyst. Since her last visit she denies pelvic pain. The patient reports additional symptoms as stabbing vaginal pain for 1 month every other day and unexpected weight loss (152lb on 3/3/2025 and 143.8lb today on 25) .      US done today: Yes.  Findings showed   Small anterior wall fibroid stable in size when compared to 3/3/2025.  Small paratubal cyst on the right adnexa that is smaller in size when compared to 3/ 3/25.  Today it measures 9 x 7 x 8 mm..  I have personally evaluated the U/S and agree with the findings. Bina Vargas MD      Additional OB/GYN History   Last Pap : 3/3/2025 neg, HPV neg  Last Completed Pap Smear            Upcoming       PAP SMEAR (Every 3 Years) Next due on 3/3/2028      2025  LIQUID-BASED PAP SMEAR WITH HPV GENOTYPING IF ASCUS ([facility])    2023  LIQUID-BASED PAP SMEAR, [facility] ([facility])                          History of abnormal Pap smear: no  Tobacco Usage?: No      Current Outpatient Medications:     Drospirenone (Slynd) 4 MG tablet, Take 1 tablet by mouth Daily., Disp: 84 tablet, Rfl: 0     Past Medical History:   Diagnosis  "Date    Allergic     Couple of years ago    Colon polyp     During myDuring my  last pregnancy had  one polyp    Fibroid About 2 ton3 months ago    Lately  have been experiencing strong sharp stabbing pains on the left side ovarie and scary heart palpitations.    Irregular menstrual cycle     Paratubal cyst 2025        Past Surgical History:   Procedure Laterality Date     SECTION      x2    COLONOSCOPY      Not sure of date but was during my last pregnancy    WISDOM TOOTH EXTRACTION  Last year       The additional following portions of the patient's history were reviewed and updated as appropriate: allergies, current medications, past family history, past medical history, past social history, past surgical history, and problem list.    Review of Systems   Constitutional:  Positive for unexpected weight loss.   HENT: Negative.     Eyes: Negative.    Respiratory: Negative.     Cardiovascular: Negative.    Gastrointestinal: Negative.    Endocrine: Negative.    Genitourinary:  Positive for menstrual problem and vaginal pain.   Musculoskeletal: Negative.    Skin: Negative.    Allergic/Immunologic: Negative.    Neurological: Negative.    Hematological: Negative.    Psychiatric/Behavioral: Negative.       All other systems reviewed and are negative.     I have reviewed and agree with the HPI, ROS, and historical information as entered above. Bina Vargas MD      Ht 160 cm (63\")   Wt 65.2 kg (143 lb 12.8 oz)   LMP 2025 (Exact Date)   BMI 25.47 kg/m²     Physical Exam  Vitals and nursing note reviewed. Exam conducted with a chaperone present.   Genitourinary:     General: Normal vulva.      Exam position: Lithotomy position.      Labia:         Right: No rash, tenderness or lesion.         Left: No rash, tenderness or lesion.       Urethra: No urethral pain, urethral swelling or urethral lesion.      Vagina: Tenderness present. No lesions.      Cervix: No cervical motion tenderness, " discharge, lesion or cervical bleeding.      Uterus: Normal. Not enlarged, not fixed and not tender.       Adnexa:         Right: No mass, tenderness or fullness.          Left: No mass, tenderness or fullness.        Rectum: No external hemorrhoid.      Comments: Chaperone Present- tenderness at vaginal introitus.        Assessment & Plan     Assessment and Plan    Problem List Items Addressed This Visit          Genitourinary and Reproductive     Menorrhagia with irregular cycle    Overview   11/6/2024-patient came off NuvaRing due to heart palpitations and was having menorrhagia with irregular cycles.  Seen by nurse practitioner which natalie labs that were within normal limits.  Ultrasound was performed and the patient had a very small endometrial polyp measuring 15 x 3 x 13 mm.  She also had a small intramural fibroid measuring 5 x 3 x 5 mm.  Patient returning to clinic today to discuss treatment options.  We discussed options including D&C, hysterectomy, cycling with progestin only pills, IUD.  Patient would like to proceed with progestin only pills for the next 3 months.  At that time we will repeat an ultrasound.  If the ultrasound is within normal limits without the endometrial polyp, patient will decide whether she would like to continue with progestin only pills or have a Mirena placed.  If a polyp is still present, we will proceed with a dilation and curettage.    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 Endometrium trilaminar. EMT 5.3mm. Polyp resolved.          Relevant Medications    Drospirenone (Slynd) 4 MG tablet    Intramural leiomyoma of uterus    Overview   10/4/24 5.5mm x3.5mm x 5.3mm vol 0.054cm   3/3/2025 5.6mm x 2.9mm x 5.4mm. Small anterior intramural fibroid. - stable    6/12/2025-         Relevant Medications    Drospirenone (Slynd) 4 MG tablet    Paratubal cyst - Primary    Overview   3/3/2025- Small para tubal cyst seen measuring 10 byx 11 x 12 mm.  Asymptomatic.     Patient informed most ovarian cysts are typically not harmful, don’t cause symptoms, and are not indicative of risk for future ovarian cancer; however, there is a small chance that any ovarian mass can be cancerous. Malignancy can be excluded if surgery is performed and tissue is evaluated.     Treatment options discussed including expectant management (low suspicion for malignancy), surveillance w/ f/u pelvic ultrasounds, and surgery if she desires histologic diagnosis or if she has persistent pain or other symptoms.    She desires surveillance with f/u TVUS in 6 weeks. Rupture precautions reviewed.     6/12/2025-paratubal cysts smaller than previously seen.  No need for follow-up.         Female dyspareunia    Relevant Orders    Ambulatory Referral to Physical Therapy for Evaluation & Treatment (Completed)     Other Visit Diagnoses         Vaginal pain        Relevant Orders    Ambulatory Referral to Physical Therapy for Evaluation & Treatment (Completed)              Reviewed ultrasound findings today.  Ultrasound is stable with paratubal cyst being slightly smaller and fibroid stable in size.  Patient's menorrhagia is now controlled.  Vaginal pain appears to be her biggest concern.  On exam she has tight levators.  She has not been intimate with her  in 2 months and when she is it is exquisitely painful.  This is never been a problem in the past.  No evidence of infection or other pathology today.  Concern for need for pelvic floor physical therapy and reassurance.Follow Up: Return if symptoms worsen or fail to improve, for Annual physical.      Bina Vargas MD  06/12/2025